# Patient Record
Sex: FEMALE | Race: WHITE | Employment: UNEMPLOYED | RURAL
[De-identification: names, ages, dates, MRNs, and addresses within clinical notes are randomized per-mention and may not be internally consistent; named-entity substitution may affect disease eponyms.]

---

## 2017-03-17 ENCOUNTER — OFFICE VISIT (OUTPATIENT)
Dept: FAMILY MEDICINE CLINIC | Age: 42
End: 2017-03-17
Payer: COMMERCIAL

## 2017-03-17 VITALS
BODY MASS INDEX: 25.78 KG/M2 | HEIGHT: 64 IN | SYSTOLIC BLOOD PRESSURE: 120 MMHG | WEIGHT: 151 LBS | OXYGEN SATURATION: 98 % | DIASTOLIC BLOOD PRESSURE: 88 MMHG | HEART RATE: 91 BPM

## 2017-03-17 DIAGNOSIS — G43.909 MIGRAINE WITHOUT STATUS MIGRAINOSUS, NOT INTRACTABLE, UNSPECIFIED MIGRAINE TYPE: ICD-10-CM

## 2017-03-17 DIAGNOSIS — G47.00 INSOMNIA, UNSPECIFIED TYPE: ICD-10-CM

## 2017-03-17 DIAGNOSIS — F32.A DEPRESSION, UNSPECIFIED DEPRESSION TYPE: ICD-10-CM

## 2017-03-17 DIAGNOSIS — M79.7 FIBROMYALGIA: Primary | ICD-10-CM

## 2017-03-17 DIAGNOSIS — E55.9 VITAMIN D DEFICIENCY: ICD-10-CM

## 2017-03-17 PROCEDURE — 99214 OFFICE O/P EST MOD 30 MIN: CPT | Performed by: NURSE PRACTITIONER

## 2017-03-17 RX ORDER — ZOLPIDEM TARTRATE 12.5 MG/1
12.5 TABLET, FILM COATED, EXTENDED RELEASE ORAL NIGHTLY PRN
Qty: 30 TABLET | Refills: 2 | Status: SHIPPED | OUTPATIENT
Start: 2017-03-17 | End: 2017-04-16

## 2017-03-17 RX ORDER — SERTRALINE HYDROCHLORIDE 100 MG/1
100 TABLET, FILM COATED ORAL DAILY
Qty: 90 TABLET | Refills: 1 | Status: SHIPPED | OUTPATIENT
Start: 2017-03-17 | End: 2017-07-07

## 2017-03-17 RX ORDER — TRAMADOL HYDROCHLORIDE 50 MG/1
TABLET ORAL
Qty: 120 TABLET | Refills: 2 | Status: SHIPPED | OUTPATIENT
Start: 2017-03-17 | End: 2017-07-07 | Stop reason: SDUPTHER

## 2017-03-17 RX ORDER — ERGOCALCIFEROL 1.25 MG/1
CAPSULE ORAL
Qty: 12 CAPSULE | Refills: 2 | Status: SHIPPED | OUTPATIENT
Start: 2017-03-17 | End: 2017-07-06 | Stop reason: SDUPTHER

## 2017-03-17 RX ORDER — TOPIRAMATE 100 MG/1
100 TABLET, FILM COATED ORAL 2 TIMES DAILY
Qty: 180 TABLET | Refills: 1 | Status: SHIPPED | OUTPATIENT
Start: 2017-03-17 | End: 2017-07-07 | Stop reason: SDUPTHER

## 2017-03-17 RX ORDER — CYCLOBENZAPRINE HCL 10 MG
TABLET ORAL
Qty: 90 TABLET | Refills: 2 | Status: SHIPPED | OUTPATIENT
Start: 2017-03-17 | End: 2020-10-26 | Stop reason: SDUPTHER

## 2017-03-17 RX ORDER — GABAPENTIN 400 MG/1
400 CAPSULE ORAL 3 TIMES DAILY
Qty: 90 CAPSULE | Refills: 2 | Status: SHIPPED | OUTPATIENT
Start: 2017-03-17 | End: 2017-07-07 | Stop reason: SDUPTHER

## 2017-03-17 ASSESSMENT — ENCOUNTER SYMPTOMS
COUGH: 0
SORE THROAT: 0
EYES NEGATIVE: 1
DIARRHEA: 0
ABDOMINAL PAIN: 0
NAUSEA: 0
SHORTNESS OF BREATH: 0
WHEEZING: 0
VOMITING: 0
ORTHOPNEA: 0

## 2017-07-06 DIAGNOSIS — E55.9 VITAMIN D DEFICIENCY: ICD-10-CM

## 2017-07-07 ENCOUNTER — OFFICE VISIT (OUTPATIENT)
Dept: FAMILY MEDICINE CLINIC | Age: 42
End: 2017-07-07
Payer: COMMERCIAL

## 2017-07-07 VITALS
SYSTOLIC BLOOD PRESSURE: 120 MMHG | WEIGHT: 153 LBS | DIASTOLIC BLOOD PRESSURE: 66 MMHG | BODY MASS INDEX: 26.12 KG/M2 | HEIGHT: 64 IN

## 2017-07-07 DIAGNOSIS — M79.7 FIBROMYALGIA: Primary | ICD-10-CM

## 2017-07-07 DIAGNOSIS — G43.909 MIGRAINE WITHOUT STATUS MIGRAINOSUS, NOT INTRACTABLE, UNSPECIFIED MIGRAINE TYPE: ICD-10-CM

## 2017-07-07 DIAGNOSIS — R53.82 CHRONIC FATIGUE: ICD-10-CM

## 2017-07-07 LAB
AMPHETAMINE SCREEN, URINE: NORMAL
BARBITURATE SCREEN, URINE: NORMAL
BENZODIAZEPINE SCREEN, URINE: NORMAL
COCAINE METABOLITE SCREEN URINE: NORMAL
MDMA URINE: NORMAL
METHADONE SCREEN, URINE: NORMAL
METHAMPHETAMINE, URINE: NORMAL
OPIATE SCREEN URINE: NORMAL
OXYCODONE SCREEN URINE: NORMAL
PHENCYCLIDINE SCREEN URINE: NORMAL
PROPOXYPHENE SCREEN, URINE: NORMAL
THC: NORMAL
TRICYCLIC ANTIDEPRESSANTS, UR: NORMAL

## 2017-07-07 PROCEDURE — 99214 OFFICE O/P EST MOD 30 MIN: CPT | Performed by: NURSE PRACTITIONER

## 2017-07-07 PROCEDURE — 80305 DRUG TEST PRSMV DIR OPT OBS: CPT | Performed by: NURSE PRACTITIONER

## 2017-07-07 RX ORDER — ERGOCALCIFEROL 1.25 MG/1
CAPSULE ORAL
Qty: 6 CAPSULE | Refills: 2 | Status: SHIPPED | OUTPATIENT
Start: 2017-07-07 | End: 2017-10-04 | Stop reason: SDUPTHER

## 2017-07-07 RX ORDER — GABAPENTIN 400 MG/1
400 CAPSULE ORAL 3 TIMES DAILY
Qty: 90 CAPSULE | Refills: 2 | Status: SHIPPED | OUTPATIENT
Start: 2017-07-07 | End: 2017-10-04 | Stop reason: SDUPTHER

## 2017-07-07 RX ORDER — TRAMADOL HYDROCHLORIDE 50 MG/1
TABLET ORAL
Qty: 120 TABLET | Refills: 2 | Status: SHIPPED | OUTPATIENT
Start: 2017-07-07 | End: 2017-10-04 | Stop reason: SDUPTHER

## 2017-07-07 RX ORDER — TOPIRAMATE 100 MG/1
100 TABLET, FILM COATED ORAL 2 TIMES DAILY
Qty: 180 TABLET | Refills: 1 | Status: SHIPPED | OUTPATIENT
Start: 2017-07-07 | End: 2017-10-04 | Stop reason: SDUPTHER

## 2017-07-07 ASSESSMENT — PATIENT HEALTH QUESTIONNAIRE - PHQ9
SUM OF ALL RESPONSES TO PHQ QUESTIONS 1-9: 0
2. FEELING DOWN, DEPRESSED OR HOPELESS: 0
1. LITTLE INTEREST OR PLEASURE IN DOING THINGS: 0
SUM OF ALL RESPONSES TO PHQ9 QUESTIONS 1 & 2: 0

## 2017-07-14 ASSESSMENT — ENCOUNTER SYMPTOMS
WHEEZING: 0
NAUSEA: 0
ABDOMINAL PAIN: 0
ORTHOPNEA: 0
SHORTNESS OF BREATH: 0
VOMITING: 0
COUGH: 0
EYES NEGATIVE: 1
DIARRHEA: 0
SORE THROAT: 0

## 2017-10-04 ENCOUNTER — OFFICE VISIT (OUTPATIENT)
Dept: FAMILY MEDICINE CLINIC | Age: 42
End: 2017-10-04
Payer: COMMERCIAL

## 2017-10-04 VITALS
SYSTOLIC BLOOD PRESSURE: 112 MMHG | HEIGHT: 64 IN | WEIGHT: 162.1 LBS | DIASTOLIC BLOOD PRESSURE: 72 MMHG | BODY MASS INDEX: 27.68 KG/M2 | OXYGEN SATURATION: 98 % | HEART RATE: 87 BPM

## 2017-10-04 DIAGNOSIS — Z13.220 SCREENING FOR HYPERLIPIDEMIA: Primary | ICD-10-CM

## 2017-10-04 DIAGNOSIS — M79.7 FIBROMYALGIA: ICD-10-CM

## 2017-10-04 DIAGNOSIS — R53.82 CHRONIC FATIGUE: ICD-10-CM

## 2017-10-04 DIAGNOSIS — R30.0 DYSURIA: ICD-10-CM

## 2017-10-04 DIAGNOSIS — E55.9 VITAMIN D DEFICIENCY: ICD-10-CM

## 2017-10-04 DIAGNOSIS — G43.909 MIGRAINE WITHOUT STATUS MIGRAINOSUS, NOT INTRACTABLE, UNSPECIFIED MIGRAINE TYPE: ICD-10-CM

## 2017-10-04 LAB
ALBUMIN SERPL-MCNC: 4.1 G/DL (ref 3.5–5.2)
ALP BLD-CCNC: 59 U/L (ref 35–104)
ALT SERPL-CCNC: 8 U/L (ref 5–33)
ANION GAP SERPL CALCULATED.3IONS-SCNC: 14 MMOL/L (ref 7–19)
AST SERPL-CCNC: 13 U/L (ref 5–32)
BILIRUB SERPL-MCNC: 0.3 MG/DL (ref 0.2–1.2)
BILIRUBIN URINE: NEGATIVE
BLOOD, URINE: NEGATIVE
BUN BLDV-MCNC: 11 MG/DL (ref 6–20)
CALCIUM SERPL-MCNC: 9.3 MG/DL (ref 8.6–10)
CHLORIDE BLD-SCNC: 103 MMOL/L (ref 98–111)
CHOLESTEROL, TOTAL: 181 MG/DL (ref 160–199)
CLARITY: CLEAR
CO2: 24 MMOL/L (ref 22–29)
COLOR: YELLOW
CREAT SERPL-MCNC: 0.8 MG/DL (ref 0.5–0.9)
GFR NON-AFRICAN AMERICAN: >60
GLUCOSE BLD-MCNC: 69 MG/DL (ref 74–109)
GLUCOSE URINE: NEGATIVE MG/DL
HCT VFR BLD CALC: 41.7 % (ref 37–47)
HDLC SERPL-MCNC: 58 MG/DL (ref 65–121)
HEMOGLOBIN: 13.6 G/DL (ref 12–16)
KETONES, URINE: NEGATIVE MG/DL
LDL CHOLESTEROL CALCULATED: 109 MG/DL
LEUKOCYTE ESTERASE, URINE: NEGATIVE
MCH RBC QN AUTO: 31.3 PG (ref 27–31)
MCHC RBC AUTO-ENTMCNC: 32.6 G/DL (ref 33–37)
MCV RBC AUTO: 95.9 FL (ref 81–99)
NITRITE, URINE: NEGATIVE
PDW BLD-RTO: 13 % (ref 11.5–14.5)
PH UA: 7
PLATELET # BLD: 257 K/UL (ref 130–400)
PMV BLD AUTO: 11.2 FL (ref 9.4–12.3)
POTASSIUM SERPL-SCNC: 4 MMOL/L (ref 3.5–5)
PROTEIN UA: NEGATIVE MG/DL
RBC # BLD: 4.35 M/UL (ref 4.2–5.4)
SODIUM BLD-SCNC: 141 MMOL/L (ref 136–145)
SPECIFIC GRAVITY UA: 1.01
TOTAL PROTEIN: 7.3 G/DL (ref 6.6–8.7)
TRIGL SERPL-MCNC: 68 MG/DL (ref 149–199)
URINE TYPE: NORMAL
UROBILINOGEN, URINE: 0.2 E.U./DL
VITAMIN B-12: 397 PG/ML (ref 211–946)
VITAMIN D 25-HYDROXY: 24 NG/ML
WBC # BLD: 8.2 K/UL (ref 4.8–10.8)

## 2017-10-04 PROCEDURE — 99214 OFFICE O/P EST MOD 30 MIN: CPT | Performed by: NURSE PRACTITIONER

## 2017-10-04 RX ORDER — TOPIRAMATE 100 MG/1
100 TABLET, FILM COATED ORAL 2 TIMES DAILY
Qty: 180 TABLET | Refills: 1 | Status: SHIPPED | OUTPATIENT
Start: 2017-10-04 | End: 2019-12-11

## 2017-10-04 RX ORDER — TRAMADOL HYDROCHLORIDE 50 MG/1
TABLET ORAL
Qty: 120 TABLET | Refills: 2 | Status: SHIPPED | OUTPATIENT
Start: 2017-10-04 | End: 2018-03-19 | Stop reason: SDUPTHER

## 2017-10-04 RX ORDER — GABAPENTIN 400 MG/1
400 CAPSULE ORAL 3 TIMES DAILY
Qty: 90 CAPSULE | Refills: 2 | Status: SHIPPED | OUTPATIENT
Start: 2017-10-04 | End: 2018-03-19 | Stop reason: SDUPTHER

## 2017-10-04 RX ORDER — ERGOCALCIFEROL 1.25 MG/1
CAPSULE ORAL
Qty: 8 CAPSULE | Refills: 2 | Status: SHIPPED | OUTPATIENT
Start: 2017-10-04 | End: 2017-10-23 | Stop reason: SDUPTHER

## 2017-10-04 ASSESSMENT — ENCOUNTER SYMPTOMS
SORE THROAT: 0
SHORTNESS OF BREATH: 0
VOMITING: 0
ABDOMINAL PAIN: 0
DIARRHEA: 0
WHEEZING: 0
COUGH: 0
ORTHOPNEA: 0
NAUSEA: 0

## 2017-10-04 NOTE — PROGRESS NOTES
capsule by mouth 3 times daily 90 capsule 2    topiramate (TOPAMAX) 100 MG tablet Take 1 tablet by mouth 2 times daily 180 tablet 1    vitamin D (ERGOCALCIFEROL) 57572 units CAPS capsule TAKE 1 CAPSULE TWICE A WEEK 8 capsule 2    cyclobenzaprine (FLEXERIL) 10 MG tablet TAKE ONE TABLET BY MOUTH 3 TIMES A DAY AS NEEDED FOR MUSCLES SPASMS 90 tablet 2    cyanocobalamin 1000 MCG/ML injection Inject 1 mL into the muscle every 14 days Please dispense 10ml each prescription, either one 10ml vial or 10 1 ml vials. 10 mL 5    SYRINGE-NEEDLE, DISP, 3 ML (BD ECLIPSE SYRINGE) 22G X 1\" 3 ML MISC Use with b 12 every 14 days 25 each 2    albuterol sulfate HFA (PROVENTIL HFA) 108 (90 BASE) MCG/ACT inhaler Inhale 2 puffs into the lungs every 6 hours as needed for Wheezing 1 Inhaler 3     No current facility-administered medications for this visit. Past Medical History:   Diagnosis Date    Carpal tunnel syndrome on right     Depression     Headache     Irritable bowel syndrome        Objective:   Physical Exam   Constitutional: She is oriented to person, place, and time. She appears well-developed and well-nourished. No distress. Neck: Normal range of motion. Neck supple. Cardiovascular: Normal rate, regular rhythm and normal heart sounds. Pulmonary/Chest: Effort normal and breath sounds normal. No respiratory distress. Abdominal: Soft. Bowel sounds are normal. She exhibits no distension. There is no tenderness. Musculoskeletal: Normal range of motion. Lymphadenopathy:     She has no cervical adenopathy. Neurological: She is alert and oriented to person, place, and time. Skin: Skin is warm and dry. Psychiatric: She has a normal mood and affect. Her behavior is normal.   Vitals reviewed. /72  Pulse 87  Ht 5' 4\" (1.626 m)  Wt 162 lb 1.6 oz (73.5 kg)  SpO2 98%  BMI 27.82 kg/m2    Assessment:      1. Screening for hyperlipidemia    2. Fibromyalgia    3.  Migraine without status

## 2017-10-21 DIAGNOSIS — E55.9 VITAMIN D DEFICIENCY: ICD-10-CM

## 2017-10-23 RX ORDER — ERGOCALCIFEROL 1.25 MG/1
CAPSULE ORAL
Qty: 6 CAPSULE | Refills: 2 | Status: SHIPPED | OUTPATIENT
Start: 2017-10-23 | End: 2019-12-11

## 2018-03-19 ENCOUNTER — OFFICE VISIT (OUTPATIENT)
Dept: PRIMARY CARE CLINIC | Age: 43
End: 2018-03-19
Payer: COMMERCIAL

## 2018-03-19 VITALS
HEART RATE: 96 BPM | DIASTOLIC BLOOD PRESSURE: 82 MMHG | BODY MASS INDEX: 27.66 KG/M2 | SYSTOLIC BLOOD PRESSURE: 114 MMHG | OXYGEN SATURATION: 98 % | RESPIRATION RATE: 20 BRPM | HEIGHT: 64 IN | TEMPERATURE: 98.5 F | WEIGHT: 162 LBS

## 2018-03-19 DIAGNOSIS — R79.89 ABNORMAL THYROID BLOOD TEST: ICD-10-CM

## 2018-03-19 DIAGNOSIS — Z12.31 SCREENING MAMMOGRAM, ENCOUNTER FOR: ICD-10-CM

## 2018-03-19 DIAGNOSIS — M79.7 FIBROMYALGIA: Primary | ICD-10-CM

## 2018-03-19 DIAGNOSIS — R53.83 FATIGUE, UNSPECIFIED TYPE: ICD-10-CM

## 2018-03-19 LAB
T4 FREE: 1.1 NG/DL (ref 0.9–1.7)
TSH SERPL DL<=0.05 MIU/L-ACNC: 2.29 UIU/ML (ref 0.27–4.2)

## 2018-03-19 PROCEDURE — 36415 COLL VENOUS BLD VENIPUNCTURE: CPT | Performed by: FAMILY MEDICINE

## 2018-03-19 PROCEDURE — 99214 OFFICE O/P EST MOD 30 MIN: CPT | Performed by: FAMILY MEDICINE

## 2018-03-19 RX ORDER — ALBUTEROL SULFATE 90 UG/1
2 AEROSOL, METERED RESPIRATORY (INHALATION) EVERY 6 HOURS PRN
Qty: 1 INHALER | Refills: 3 | Status: SHIPPED | OUTPATIENT
Start: 2018-03-19 | End: 2020-10-26 | Stop reason: SDUPTHER

## 2018-03-19 RX ORDER — TRAMADOL HYDROCHLORIDE 50 MG/1
TABLET ORAL
Qty: 120 TABLET | Refills: 2 | Status: SHIPPED | OUTPATIENT
Start: 2018-03-19 | End: 2018-04-19

## 2018-03-19 RX ORDER — GABAPENTIN 400 MG/1
400 CAPSULE ORAL 3 TIMES DAILY
Qty: 90 CAPSULE | Refills: 2 | Status: SHIPPED | OUTPATIENT
Start: 2018-03-19 | End: 2018-07-15 | Stop reason: SDUPTHER

## 2018-03-19 ASSESSMENT — ENCOUNTER SYMPTOMS
ABDOMINAL PAIN: 0
DIARRHEA: 0
COLOR CHANGE: 0
EYE DISCHARGE: 0
BACK PAIN: 0
VOMITING: 0
WHEEZING: 0
COUGH: 0
NAUSEA: 0

## 2018-09-12 DIAGNOSIS — M79.7 FIBROMYALGIA: ICD-10-CM

## 2018-09-12 RX ORDER — TRAMADOL HYDROCHLORIDE 50 MG/1
TABLET ORAL
Qty: 120 TABLET | Refills: 0 | Status: SHIPPED | OUTPATIENT
Start: 2018-09-12 | End: 2018-10-04 | Stop reason: SDUPTHER

## 2018-10-04 ENCOUNTER — OFFICE VISIT (OUTPATIENT)
Dept: PRIMARY CARE CLINIC | Age: 43
End: 2018-10-04
Payer: COMMERCIAL

## 2018-10-04 VITALS
DIASTOLIC BLOOD PRESSURE: 78 MMHG | RESPIRATION RATE: 20 BRPM | SYSTOLIC BLOOD PRESSURE: 112 MMHG | HEART RATE: 95 BPM | BODY MASS INDEX: 27.49 KG/M2 | WEIGHT: 161 LBS | OXYGEN SATURATION: 98 % | TEMPERATURE: 97.7 F | HEIGHT: 64 IN

## 2018-10-04 DIAGNOSIS — G43.909 MIGRAINE WITHOUT STATUS MIGRAINOSUS, NOT INTRACTABLE, UNSPECIFIED MIGRAINE TYPE: Primary | ICD-10-CM

## 2018-10-04 DIAGNOSIS — Z79.899 MEDICATION MANAGEMENT: ICD-10-CM

## 2018-10-04 DIAGNOSIS — M79.7 FIBROMYALGIA: ICD-10-CM

## 2018-10-04 LAB

## 2018-10-04 PROCEDURE — 80305 DRUG TEST PRSMV DIR OPT OBS: CPT | Performed by: FAMILY MEDICINE

## 2018-10-04 PROCEDURE — 99213 OFFICE O/P EST LOW 20 MIN: CPT | Performed by: FAMILY MEDICINE

## 2018-10-04 RX ORDER — TRAMADOL HYDROCHLORIDE 50 MG/1
50 TABLET ORAL EVERY 8 HOURS PRN
Qty: 120 TABLET | Refills: 0 | Status: SHIPPED | OUTPATIENT
Start: 2018-10-04 | End: 2018-11-04

## 2018-10-04 RX ORDER — GABAPENTIN 400 MG/1
400 CAPSULE ORAL 3 TIMES DAILY
Qty: 90 CAPSULE | Refills: 1 | Status: SHIPPED | OUTPATIENT
Start: 2018-10-04 | End: 2019-12-11

## 2018-10-04 ASSESSMENT — ENCOUNTER SYMPTOMS
NAUSEA: 0
BACK PAIN: 0
WHEEZING: 0
COUGH: 0
DIARRHEA: 0
COLOR CHANGE: 0
ABDOMINAL PAIN: 0
VOMITING: 0
EYE DISCHARGE: 0

## 2018-10-04 NOTE — PROGRESS NOTES
type    Fibromyalgia  -     traMADol (ULTRAM) 50 MG tablet; Take 1 tablet by mouth every 8 hours as needed for Pain for up to 31 days. .  -     gabapentin (NEURONTIN) 400 MG capsule; Take 1 capsule by mouth 3 times daily for 30 days. .    Medication management  -     POCT Rapid Drug Screen        PLAN:    Continue current medications. Med agreement in the chart. Urine drug screen obtained today. Follow-up in 6 months for checkup unless needed sooner. EMR Dragon/transcription disclaimer:  Much of this encounter note is electronic transcription/translation of spoken language to printed texts. The electronic translation of spoken language may be erroneous, or at times, nonsensical words or phrases may be inadvertently transcribed.   Although I have reviewed the note for such errors, some may still exist.

## 2019-12-11 ENCOUNTER — OFFICE VISIT (OUTPATIENT)
Dept: PRIMARY CARE CLINIC | Age: 44
End: 2019-12-11
Payer: COMMERCIAL

## 2019-12-11 VITALS
HEIGHT: 64 IN | SYSTOLIC BLOOD PRESSURE: 134 MMHG | RESPIRATION RATE: 18 BRPM | DIASTOLIC BLOOD PRESSURE: 86 MMHG | BODY MASS INDEX: 27.66 KG/M2 | WEIGHT: 162 LBS | HEART RATE: 81 BPM | OXYGEN SATURATION: 99 % | TEMPERATURE: 98.3 F

## 2019-12-11 DIAGNOSIS — F33.1 MODERATE EPISODE OF RECURRENT MAJOR DEPRESSIVE DISORDER (HCC): ICD-10-CM

## 2019-12-11 DIAGNOSIS — Z00.00 WELLNESS EXAMINATION: Primary | ICD-10-CM

## 2019-12-11 DIAGNOSIS — M79.7 FIBROMYALGIA: ICD-10-CM

## 2019-12-11 LAB
ALBUMIN SERPL-MCNC: 4.9 G/DL (ref 3.5–5.2)
ALP BLD-CCNC: 73 U/L (ref 35–104)
ALT SERPL-CCNC: 6 U/L (ref 5–33)
ANION GAP SERPL CALCULATED.3IONS-SCNC: 21 MMOL/L (ref 7–19)
AST SERPL-CCNC: 12 U/L (ref 5–32)
BASOPHILS ABSOLUTE: 0.1 K/UL (ref 0–0.2)
BASOPHILS RELATIVE PERCENT: 0.7 % (ref 0–1)
BILIRUB SERPL-MCNC: <0.2 MG/DL (ref 0.2–1.2)
BUN BLDV-MCNC: 11 MG/DL (ref 6–20)
CALCIUM SERPL-MCNC: 10.3 MG/DL (ref 8.6–10)
CHLORIDE BLD-SCNC: 103 MMOL/L (ref 98–111)
CHOLESTEROL, TOTAL: 177 MG/DL (ref 160–199)
CO2: 21 MMOL/L (ref 22–29)
CREAT SERPL-MCNC: 0.8 MG/DL (ref 0.5–0.9)
EOSINOPHILS ABSOLUTE: 0.1 K/UL (ref 0–0.6)
EOSINOPHILS RELATIVE PERCENT: 1.3 % (ref 0–5)
GFR NON-AFRICAN AMERICAN: >60
GLUCOSE BLD-MCNC: 85 MG/DL (ref 74–109)
HCT VFR BLD CALC: 44.9 % (ref 37–47)
HDLC SERPL-MCNC: 63 MG/DL (ref 65–121)
HEMOGLOBIN: 14.2 G/DL (ref 12–16)
IMMATURE GRANULOCYTES #: 0 K/UL
LDL CHOLESTEROL CALCULATED: 98 MG/DL
LYMPHOCYTES ABSOLUTE: 1.9 K/UL (ref 1.1–4.5)
LYMPHOCYTES RELATIVE PERCENT: 27 % (ref 20–40)
MCH RBC QN AUTO: 30 PG (ref 27–31)
MCHC RBC AUTO-ENTMCNC: 31.6 G/DL (ref 33–37)
MCV RBC AUTO: 94.7 FL (ref 81–99)
MONOCYTES ABSOLUTE: 0.5 K/UL (ref 0–0.9)
MONOCYTES RELATIVE PERCENT: 7.1 % (ref 0–10)
NEUTROPHILS ABSOLUTE: 4.4 K/UL (ref 1.5–7.5)
NEUTROPHILS RELATIVE PERCENT: 63.6 % (ref 50–65)
PDW BLD-RTO: 12.6 % (ref 11.5–14.5)
PLATELET # BLD: 280 K/UL (ref 130–400)
PMV BLD AUTO: 11.2 FL (ref 9.4–12.3)
POTASSIUM SERPL-SCNC: 4.6 MMOL/L (ref 3.5–5)
RBC # BLD: 4.74 M/UL (ref 4.2–5.4)
SODIUM BLD-SCNC: 145 MMOL/L (ref 136–145)
TOTAL PROTEIN: 8.4 G/DL (ref 6.6–8.7)
TRIGL SERPL-MCNC: 82 MG/DL (ref 0–149)
TSH SERPL DL<=0.05 MIU/L-ACNC: 1.1 UIU/ML (ref 0.27–4.2)
WBC # BLD: 6.9 K/UL (ref 4.8–10.8)

## 2019-12-11 PROCEDURE — 36415 COLL VENOUS BLD VENIPUNCTURE: CPT | Performed by: FAMILY MEDICINE

## 2019-12-11 PROCEDURE — 99214 OFFICE O/P EST MOD 30 MIN: CPT | Performed by: FAMILY MEDICINE

## 2019-12-11 RX ORDER — TRAMADOL HYDROCHLORIDE 50 MG/1
50 TABLET ORAL 2 TIMES DAILY
COMMUNITY
End: 2019-12-11 | Stop reason: ALTCHOICE

## 2019-12-11 RX ORDER — SERTRALINE HYDROCHLORIDE 25 MG/1
25 TABLET, FILM COATED ORAL DAILY
COMMUNITY
End: 2019-12-11 | Stop reason: SDUPTHER

## 2019-12-11 RX ORDER — GABAPENTIN 400 MG/1
400 CAPSULE ORAL 3 TIMES DAILY
Qty: 90 CAPSULE | Refills: 0 | Status: SHIPPED | OUTPATIENT
Start: 2019-12-11 | End: 2020-10-26

## 2019-12-11 ASSESSMENT — ENCOUNTER SYMPTOMS
ABDOMINAL PAIN: 0
DIARRHEA: 0
COUGH: 0
VOMITING: 0
EYE DISCHARGE: 0
WHEEZING: 0
BACK PAIN: 1
NAUSEA: 0
COLOR CHANGE: 0

## 2019-12-23 ENCOUNTER — TELEPHONE (OUTPATIENT)
Dept: PRIMARY CARE CLINIC | Age: 44
End: 2019-12-23

## 2019-12-30 RX ORDER — AMITRIPTYLINE HYDROCHLORIDE 25 MG/1
25 TABLET, FILM COATED ORAL NIGHTLY
Qty: 30 TABLET | Refills: 5 | Status: SHIPPED | OUTPATIENT
Start: 2019-12-30 | End: 2020-04-23 | Stop reason: SDUPTHER

## 2020-01-06 RX ORDER — SERTRALINE HYDROCHLORIDE 25 MG/1
25 TABLET, FILM COATED ORAL DAILY
Qty: 90 TABLET | Refills: 3 | Status: SHIPPED | OUTPATIENT
Start: 2020-01-06 | End: 2020-01-09 | Stop reason: ALTCHOICE

## 2020-01-09 ENCOUNTER — OFFICE VISIT (OUTPATIENT)
Dept: PRIMARY CARE CLINIC | Age: 45
End: 2020-01-09
Payer: COMMERCIAL

## 2020-01-09 VITALS
SYSTOLIC BLOOD PRESSURE: 108 MMHG | HEIGHT: 64 IN | OXYGEN SATURATION: 94 % | DIASTOLIC BLOOD PRESSURE: 62 MMHG | WEIGHT: 163 LBS | TEMPERATURE: 99 F | BODY MASS INDEX: 27.83 KG/M2 | HEART RATE: 84 BPM | RESPIRATION RATE: 22 BRPM

## 2020-01-09 PROCEDURE — 99213 OFFICE O/P EST LOW 20 MIN: CPT | Performed by: FAMILY MEDICINE

## 2020-01-10 ASSESSMENT — ENCOUNTER SYMPTOMS
WHEEZING: 0
NAUSEA: 0
DIARRHEA: 0
COUGH: 0
COLOR CHANGE: 0
EYE DISCHARGE: 0
VOMITING: 0
BACK PAIN: 0
ABDOMINAL PAIN: 0

## 2020-01-10 NOTE — PROGRESS NOTES
vomiting. Genitourinary: Negative for difficulty urinating, frequency and urgency. Musculoskeletal: Positive for arthralgias and myalgias. Negative for back pain and gait problem. Skin: Negative for color change and rash. Neurological: Negative for dizziness and headaches. Hematological: Does not bruise/bleed easily. Psychiatric/Behavioral: Negative for sleep disturbance and suicidal ideas. OBJECTIVE:     Physical Exam  Constitutional:       General: She is not in acute distress. Appearance: She is well-developed. She is not diaphoretic. HENT:      Head: Normocephalic and atraumatic. Neck:      Musculoskeletal: Normal range of motion and neck supple. Cardiovascular:      Rate and Rhythm: Normal rate and regular rhythm. Heart sounds: Normal heart sounds. No murmur. Pulmonary:      Effort: Pulmonary effort is normal. No respiratory distress. Breath sounds: Normal breath sounds. Skin:     General: Skin is warm and dry. Neurological:      Mental Status: She is alert and oriented to person, place, and time. Psychiatric:         Behavior: Behavior normal.         Thought Content: Thought content normal.         Judgment: Judgment normal.        /62 (Site: Left Upper Arm, Position: Sitting, Cuff Size: Medium Adult)   Pulse 84   Temp 99 °F (37.2 °C) (Temporal)   Resp 22   Ht 5' 4\" (1.626 m)   Wt 163 lb (73.9 kg)   SpO2 94%   BMI 27.98 kg/m²      ASSESSMENT:    Christopher Goodrich was seen today for 1 month follow-up, depression and chronic pain. Diagnoses and all orders for this visit:    Fibromyalgia    Fatigue, unspecified type        PLAN:    Discussed that we would continue the amitriptyline for a few more weeks. I would like for her to follow back up with me in about 3 to 4 weeks to see how she is doing. If symptoms or not better at that point discussed that we could either increase the dose or we could change to something different.     EMR Dragon/transcription disclaimer:  Much of this encounter note is electronic transcription/translation of spoken language toprinted texts. The electronic translation of spoken language may be erroneous, or at times, nonsensical words or phrases may be inadvertently transcribed.   Although I have reviewed the note for such errors, some may stillexist.

## 2020-01-20 RX ORDER — SERTRALINE HYDROCHLORIDE 25 MG/1
25 TABLET, FILM COATED ORAL DAILY
Qty: 90 TABLET | Refills: 3 | OUTPATIENT
Start: 2020-01-20

## 2020-01-23 ENCOUNTER — OFFICE VISIT (OUTPATIENT)
Dept: PRIMARY CARE CLINIC | Age: 45
End: 2020-01-23
Payer: COMMERCIAL

## 2020-01-23 VITALS
BODY MASS INDEX: 27.36 KG/M2 | WEIGHT: 159.4 LBS | TEMPERATURE: 99.1 F | RESPIRATION RATE: 16 BRPM | DIASTOLIC BLOOD PRESSURE: 62 MMHG | SYSTOLIC BLOOD PRESSURE: 110 MMHG | HEART RATE: 98 BPM | OXYGEN SATURATION: 97 %

## 2020-01-23 PROCEDURE — 99213 OFFICE O/P EST LOW 20 MIN: CPT | Performed by: FAMILY MEDICINE

## 2020-01-23 ASSESSMENT — ENCOUNTER SYMPTOMS
ABDOMINAL PAIN: 0
NAUSEA: 0
VOMITING: 0
BACK PAIN: 0
EYE DISCHARGE: 0
COUGH: 0
WHEEZING: 0
DIARRHEA: 0
COLOR CHANGE: 0

## 2020-01-23 NOTE — PROGRESS NOTES
SUBJECTIVE:    Patient ID: Vani Acuna is a 38 y.o. female. HPI:   She is here today for 2-week follow-up on medication. She states she feels like the amitriptyline is significantly helping her. She states that she feels like it is helped with the fibromyalgia. She states that she also feels like it has helped with her insomnia. She states that she does feel little foggy in the mornings when she wakes up but otherwise she is tolerating it well. She states that she also thinks that it is helping with her anxiety and depression. She states that overall she is doing well. Past Medical History:   Diagnosis Date    Carpal tunnel syndrome on right     Depression     Fibromyalgia     Headache(784.0)     Irritable bowel syndrome       Current Outpatient Medications   Medication Sig Dispense Refill    amitriptyline (ELAVIL) 25 MG tablet Take 1 tablet by mouth nightly 30 tablet 5    gabapentin (NEURONTIN) 400 MG capsule Take 1 capsule by mouth 3 times daily for 30 days. 90 capsule 0    albuterol sulfate HFA (VENTOLIN HFA) 108 (90 Base) MCG/ACT inhaler Inhale 2 puffs into the lungs every 6 hours as needed for Wheezing 1 Inhaler 3    cyclobenzaprine (FLEXERIL) 10 MG tablet TAKE ONE TABLET BY MOUTH 3 TIMES A DAY AS NEEDED FOR MUSCLES SPASMS 90 tablet 2     No current facility-administered medications for this visit. Allergies   Allergen Reactions    Codeine      Or synth.  Ms Contin Maza Dyess Sulfate]        Review of Systems   Constitutional: Negative for activity change, appetite change and fever. HENT: Negative for congestion and nosebleeds. Eyes: Negative for discharge. Respiratory: Negative for cough and wheezing. Cardiovascular: Negative for chest pain and leg swelling. Gastrointestinal: Negative for abdominal pain, diarrhea, nausea and vomiting. Genitourinary: Negative for difficulty urinating, frequency and urgency. Musculoskeletal: Negative for back pain and gait problem. phrases may be inadvertently transcribed.   Although I have reviewed the note for such errors, some may stillexist.

## 2020-03-30 ENCOUNTER — VIRTUAL VISIT (OUTPATIENT)
Dept: PRIMARY CARE CLINIC | Age: 45
End: 2020-03-30
Payer: COMMERCIAL

## 2020-03-30 PROCEDURE — 99213 OFFICE O/P EST LOW 20 MIN: CPT | Performed by: FAMILY MEDICINE

## 2020-03-30 RX ORDER — AMOXICILLIN AND CLAVULANATE POTASSIUM 875; 125 MG/1; MG/1
1 TABLET, FILM COATED ORAL 2 TIMES DAILY
Qty: 14 TABLET | Refills: 0 | Status: SHIPPED | OUTPATIENT
Start: 2020-03-30 | End: 2020-04-06

## 2020-03-30 ASSESSMENT — ENCOUNTER SYMPTOMS
VOMITING: 0
COUGH: 0
EYE DISCHARGE: 0
BACK PAIN: 0
DIARRHEA: 0
COLOR CHANGE: 0
ABDOMINAL PAIN: 0
NAUSEA: 0
WHEEZING: 0

## 2020-03-30 NOTE — PROGRESS NOTES
Evelyn [facility], [facility], [place]  Phone:  [phone]      3/30/2020     TELEHEALTH EVALUATION -- Audio/Visual (During TFVHI-57 public health emergency)    HPI:    Nila Fleischer (:  1975) has requested an audio/video evaluation for the following concern(s):    Gum swelling for 2 days on upper right gum. She has a partial that she wears in that area. She states that it has been tender and is also tender to chew. She has had a history of gum abscesses in the past.  She is not running fever and is not had chills. She states that she has not had any bad taste in her mouth. She has not been on any antibiotics recently. Review of Systems   Constitutional: Negative for activity change, appetite change and fever. HENT: Positive for mouth sores (gum swelling and pain). Negative for congestion and nosebleeds. Eyes: Negative for discharge. Respiratory: Negative for cough and wheezing. Cardiovascular: Negative for chest pain and leg swelling. Gastrointestinal: Negative for abdominal pain, diarrhea, nausea and vomiting. Genitourinary: Negative for difficulty urinating, frequency and urgency. Musculoskeletal: Negative for back pain and gait problem. Skin: Negative for color change and rash. Neurological: Negative for dizziness and headaches. Hematological: Does not bruise/bleed easily. Psychiatric/Behavioral: Negative for sleep disturbance and suicidal ideas. Prior to Visit Medications    Medication Sig Taking? Authorizing Provider   amoxicillin-clavulanate (AUGMENTIN) 875-125 MG per tablet Take 1 tablet by mouth 2 times daily for 7 days Yes Shama Burton MD   amitriptyline (ELAVIL) 25 MG tablet Take 1 tablet by mouth nightly  Jered Kenney MD   gabapentin (NEURONTIN) 400 MG capsule Take 1 capsule by mouth 3 times daily for 30 days.   Shama Burton MD   albuterol sulfate HFA (VENTOLIN HFA) 108 (90 Base) MCG/ACT inhaler Inhale 2 puffs into the lungs every 6 hours as needed for Wheezing  Shama Burton MD   cyclobenzaprine (FLEXERIL) 10 MG tablet TAKE ONE TABLET BY MOUTH 3 TIMES A DAY AS NEEDED FOR MUSCLES SPASMS  Nuris Hutson, APRN - CNP       Social History     Tobacco Use    Smoking status: Former Smoker     Packs/day: 0.50     Years: 30.00     Pack years: 15.00     Types: Cigarettes     Last attempt to quit: 2018     Years since quittin.1    Smokeless tobacco: Never Used   Substance Use Topics    Alcohol use: No    Drug use: Not on file            PHYSICAL EXAMINATION:  Physical Exam  Constitutional:       General: She is not in acute distress. Appearance: Normal appearance. She is not ill-appearing. HENT:      Head: Normocephalic and atraumatic. Nose: Nose normal.      Mouth/Throat:      Mouth: Mucous membranes are moist.   Eyes:      Extraocular Movements: Extraocular movements intact. Conjunctiva/sclera: Conjunctivae normal.   Neck:      Musculoskeletal: Normal range of motion. Skin:     Findings: No rash. Neurological:      General: No focal deficit present. Mental Status: She is alert and oriented to person, place, and time. Mental status is at baseline. Cranial Nerves: No cranial nerve deficit. Psychiatric:         Mood and Affect: Mood normal.         Behavior: Behavior normal.         Thought Content: Thought content normal.         Judgment: Judgment normal.         Due to this being a TeleHealth encounter, evaluation of the following organ systems is limited: Vitals/Constitutional/EENT/Resp/CV/GI//MS/Neuro/Skin/Heme-Lymph-Imm. ASSESSMENT/PLAN:  1. Abscess of upper gum  I have sent Augmentin to the pharmacy for abscess. I encouraged her to do peroxide swishes. She is to let us know if her symptoms or not getting better and we can refer her to oral surgery if needed. Return if symptoms worsen or fail to improve.       An  electronic signature was used to authenticate this

## 2020-04-23 ENCOUNTER — VIRTUAL VISIT (OUTPATIENT)
Dept: PRIMARY CARE CLINIC | Age: 45
End: 2020-04-23
Payer: COMMERCIAL

## 2020-04-23 VITALS — HEIGHT: 64 IN | BODY MASS INDEX: 24.28 KG/M2 | WEIGHT: 142.2 LBS

## 2020-04-23 PROCEDURE — 99213 OFFICE O/P EST LOW 20 MIN: CPT | Performed by: FAMILY MEDICINE

## 2020-04-23 RX ORDER — AMITRIPTYLINE HYDROCHLORIDE 50 MG/1
50 TABLET, FILM COATED ORAL NIGHTLY
Qty: 30 TABLET | Refills: 5 | Status: SHIPPED | OUTPATIENT
Start: 2020-04-23 | End: 2020-05-22 | Stop reason: SDUPTHER

## 2020-04-23 ASSESSMENT — ENCOUNTER SYMPTOMS
COUGH: 0
DIARRHEA: 0
COLOR CHANGE: 0
VOMITING: 0
ABDOMINAL PAIN: 0
WHEEZING: 0
EYE DISCHARGE: 0
NAUSEA: 0
BACK PAIN: 0

## 2020-04-23 NOTE — PROGRESS NOTES
[address], [place]  Phone:  [phone]      2020     TELEHEALTH EVALUATION -- Audio/Visual (During CARCJ-13 public health emergency)    HPI:    Billie Almeida (:  1975) has requested an audio/video evaluation for the following concern(s):    Patient was seen today via video visit for follow-up on her fibromyalgia. She states the amitriptyline does seem to be helping but she feels like the dose needs to be increased some. She states that she is better but feels like that she is still not where she wants to be. She states that she is able to get out and walk now and feels like she has more energy. She states that she is still not sleeping well. She states that she has not really had any side effects to the amitriptyline and seems to be tolerating it well. Review of Systems   Constitutional: Negative for activity change, appetite change and fever. HENT: Negative for congestion and nosebleeds. Eyes: Negative for discharge. Respiratory: Negative for cough and wheezing. Cardiovascular: Negative for chest pain and leg swelling. Gastrointestinal: Negative for abdominal pain, diarrhea, nausea and vomiting. Genitourinary: Negative for difficulty urinating, frequency and urgency. Musculoskeletal: Positive for arthralgias and myalgias. Negative for back pain and gait problem. Skin: Negative for color change and rash. Neurological: Negative for dizziness and headaches. Hematological: Does not bruise/bleed easily. Psychiatric/Behavioral: Positive for sleep disturbance. Negative for suicidal ideas. Prior to Visit Medications    Medication Sig Taking? Authorizing Provider   amitriptyline (ELAVIL) 50 MG tablet Take 1 tablet by mouth nightly Yes Sana Carrera MD   gabapentin (NEURONTIN) 400 MG capsule Take 1 capsule by mouth 3 times daily for 30 days.   Sana Carrera MD   albuterol sulfate HFA (VENTOLIN HFA) 108 (90 Base) MCG/ACT inhaler Inhale 2 puffs into the lungs every 6 hours as needed for Wheezing  Shama Burton MD   cyclobenzaprine (FLEXERIL) 10 MG tablet TAKE ONE TABLET BY MOUTH 3 TIMES A DAY AS NEEDED FOR MUSCLES SPASMS  Nuris Hutson, APRN - CNP       Social History     Tobacco Use    Smoking status: Former Smoker     Packs/day: 0.50     Years: 30.00     Pack years: 15.00     Types: Cigarettes     Last attempt to quit: 2018     Years since quittin.2    Smokeless tobacco: Never Used   Substance Use Topics    Alcohol use: No    Drug use: Not on file        Allergies   Allergen Reactions    Codeine      Or synth.  Ms Contin [Morphine Sulfate]    ,   Past Medical History:   Diagnosis Date    Carpal tunnel syndrome on right     Depression     Fibromyalgia     Headache(784.0)     Irritable bowel syndrome    ,   Past Surgical History:   Procedure Laterality Date     SECTION      MOUTH SURGERY     ,   Social History     Tobacco Use    Smoking status: Former Smoker     Packs/day: 0.50     Years: 30.00     Pack years: 15.00     Types: Cigarettes     Last attempt to quit: 2018     Years since quittin.2    Smokeless tobacco: Never Used   Substance Use Topics    Alcohol use: No    Drug use: Not on file   ,   Family History   Problem Relation Age of Onset    Lung Cancer Mother     Heart Disease Father     Hypertension Brother        PHYSICAL EXAMINATION:  Physical Exam  Constitutional:       General: She is not in acute distress. Appearance: Normal appearance. She is not ill-appearing. HENT:      Head: Normocephalic and atraumatic. Nose: Nose normal.   Eyes:      Extraocular Movements: Extraocular movements intact. Conjunctiva/sclera: Conjunctivae normal.   Neck:      Musculoskeletal: Normal range of motion. Skin:     Findings: No rash. Neurological:      General: No focal deficit present. Mental Status: She is alert and oriented to person, place, and time. Mental status is at baseline. Cranial Nerves: No cranial nerve deficit. Psychiatric:         Attention and Perception: Attention normal.         Mood and Affect: Mood and affect normal.         Speech: Speech normal.         Behavior: Behavior normal. Behavior is cooperative. Thought Content: Thought content normal.         Cognition and Memory: Cognition and memory normal.         Judgment: Judgment normal.         Due to this being a TeleHealth encounter, evaluation of the following organ systems is limited: Vitals/Constitutional/EENT/Resp/CV/GI//MS/Neuro/Skin/Heme-Lymph-Imm. ASSESSMENT/PLAN:  1. Fibromyalgia  I am going to increase her amitriptyline to 50 mg nightly. She is to follow-up with us if symptoms or not improving otherwise we will see her back in 6 months for a physical and checkup unless needed sooner. 2. Insomnia, unspecified type  Increase amitriptyline to 50 mg nightly. Return in about 6 months (around 10/23/2020) for 6 month follow up. An  electronic signature was used to authenticate this note. --Henry Valdez MD on 4/23/2020 at 12:22 PM      Pursuant to the emergency declaration under the [facility], [facility]5 waiver authority and the [employer] Act, this Virtual  Visit was conducted, with patient's consent, to reduce the patient's risk of exposure to COVID-19 and provide continuity of care for an established patient. Services were provided through a video synchronous discussion virtually to substitute for in-person clinic visit.

## 2020-05-26 RX ORDER — AMITRIPTYLINE HYDROCHLORIDE 50 MG/1
50 TABLET, FILM COATED ORAL NIGHTLY
Qty: 90 TABLET | Refills: 3 | Status: SHIPPED | OUTPATIENT
Start: 2020-05-26 | End: 2020-10-26 | Stop reason: SDUPTHER

## 2020-06-25 ENCOUNTER — NURSE ONLY (OUTPATIENT)
Dept: PRIMARY CARE CLINIC | Age: 45
End: 2020-06-25
Payer: COMMERCIAL

## 2020-06-25 LAB
APPEARANCE FLUID: CLEAR
BILIRUBIN, POC: NORMAL
BLOOD URINE, POC: NORMAL
CLARITY, POC: CLEAR
COLOR, POC: YELLOW
GLUCOSE URINE, POC: NORMAL
KETONES, POC: NORMAL
LEUKOCYTE EST, POC: NORMAL
NITRITE, POC: NORMAL
PH, POC: NORMAL
PROTEIN, POC: NORMAL
SPECIFIC GRAVITY, POC: NORMAL
UROBILINOGEN, POC: NORMAL

## 2020-06-25 PROCEDURE — 81002 URINALYSIS NONAUTO W/O SCOPE: CPT | Performed by: FAMILY MEDICINE

## 2020-06-30 ENCOUNTER — OFFICE VISIT (OUTPATIENT)
Dept: PRIMARY CARE CLINIC | Age: 45
End: 2020-06-30
Payer: COMMERCIAL

## 2020-06-30 VITALS
BODY MASS INDEX: 23.93 KG/M2 | SYSTOLIC BLOOD PRESSURE: 104 MMHG | TEMPERATURE: 98.5 F | DIASTOLIC BLOOD PRESSURE: 88 MMHG | HEIGHT: 64 IN | RESPIRATION RATE: 16 BRPM | HEART RATE: 80 BPM | WEIGHT: 140.2 LBS

## 2020-06-30 LAB
BILIRUBIN, POC: ABNORMAL
BLOOD URINE, POC: ABNORMAL
CLARITY, POC: CLEAR
COLOR, POC: YELLOW
GLUCOSE URINE, POC: ABNORMAL
KETONES, POC: ABNORMAL
LEUKOCYTE EST, POC: ABNORMAL
NITRITE, POC: ABNORMAL
PH, POC: 6
PROTEIN, POC: ABNORMAL
SPECIFIC GRAVITY, POC: 1005
UROBILINOGEN, POC: 0.2

## 2020-06-30 PROCEDURE — 81002 URINALYSIS NONAUTO W/O SCOPE: CPT | Performed by: FAMILY MEDICINE

## 2020-06-30 PROCEDURE — 99213 OFFICE O/P EST LOW 20 MIN: CPT | Performed by: FAMILY MEDICINE

## 2020-06-30 RX ORDER — CIPROFLOXACIN 250 MG/1
250 TABLET, FILM COATED ORAL 2 TIMES DAILY
Qty: 14 TABLET | Refills: 0 | Status: SHIPPED | OUTPATIENT
Start: 2020-06-30 | End: 2020-07-07

## 2020-06-30 ASSESSMENT — ENCOUNTER SYMPTOMS
NAUSEA: 0
WHEEZING: 0
COLOR CHANGE: 0
ABDOMINAL PAIN: 0
DIARRHEA: 0
EYE DISCHARGE: 0
COUGH: 0
BACK PAIN: 0
VOMITING: 0

## 2020-06-30 NOTE — PROGRESS NOTES
SUBJECTIVE:    Patient ID: Uzma Santos is a 38 y.o. female. HPI:   Urinary Tract Infection: Patient complains of frequency, burning with urination, suprapubic pressure She has had symptoms for a few days. Patient denies back pain and fever. Patient does have a history of UTIs and has seen urology in the past but has not seen them recently. She has been drinking plenty of fluids and has taken Azo to help with her symptoms. Patient does not have a history of pyelonephritis. Past Medical History:   Diagnosis Date    Carpal tunnel syndrome on right     Depression     Fibromyalgia     Headache(784.0)     Irritable bowel syndrome       Current Outpatient Medications   Medication Sig Dispense Refill    Multiple Vitamins-Minerals (ONE DAILY MULTIVITAMIN WOMEN PO) Take by mouth      ciprofloxacin (CIPRO) 250 MG tablet Take 1 tablet by mouth 2 times daily for 7 days 14 tablet 0    amitriptyline (ELAVIL) 50 MG tablet Take 1 tablet by mouth nightly 90 tablet 3    gabapentin (NEURONTIN) 400 MG capsule Take 1 capsule by mouth 3 times daily for 30 days. 90 capsule 0    albuterol sulfate HFA (VENTOLIN HFA) 108 (90 Base) MCG/ACT inhaler Inhale 2 puffs into the lungs every 6 hours as needed for Wheezing 1 Inhaler 3    cyclobenzaprine (FLEXERIL) 10 MG tablet TAKE ONE TABLET BY MOUTH 3 TIMES A DAY AS NEEDED FOR MUSCLES SPASMS 90 tablet 2     No current facility-administered medications for this visit. Allergies   Allergen Reactions    Codeine      Or synth.  Ms Contin Kandee Lunger Sulfate]        Review of Systems   Constitutional: Negative for activity change, appetite change and fever. HENT: Negative for congestion and nosebleeds. Eyes: Negative for discharge. Respiratory: Negative for cough and wheezing. Cardiovascular: Negative for chest pain and leg swelling. Gastrointestinal: Negative for abdominal pain, diarrhea, nausea and vomiting.    Genitourinary: Positive for dysuria, frequency and urgency. Negative for difficulty urinating. Musculoskeletal: Negative for back pain and gait problem. Skin: Negative for color change and rash. Neurological: Negative for dizziness and headaches. Hematological: Does not bruise/bleed easily. Psychiatric/Behavioral: Negative for sleep disturbance and suicidal ideas. OBJECTIVE:     Physical Exam  Vitals signs reviewed. Constitutional:       General: She is not in acute distress. Appearance: Normal appearance. She is well-developed. She is not diaphoretic. HENT:      Head: Normocephalic and atraumatic. Right Ear: External ear normal.      Left Ear: External ear normal.   Neck:      Musculoskeletal: Normal range of motion and neck supple. Cardiovascular:      Rate and Rhythm: Normal rate and regular rhythm. Pulses: Normal pulses. Heart sounds: Normal heart sounds. No murmur. Pulmonary:      Effort: Pulmonary effort is normal. No respiratory distress. Breath sounds: Normal breath sounds. Skin:     General: Skin is warm and dry. Neurological:      General: No focal deficit present. Mental Status: She is alert and oriented to person, place, and time. Mental status is at baseline. Psychiatric:         Mood and Affect: Mood normal.         Behavior: Behavior normal.         Thought Content: Thought content normal.         Judgment: Judgment normal.        /88 (Site: Left Upper Arm, Position: Sitting, Cuff Size: Medium Adult)   Pulse 80   Temp 98.5 °F (36.9 °C) (Temporal)   Resp 16   Ht 5' 4\" (1.626 m)   Wt 140 lb 3.2 oz (63.6 kg)   BMI 24.07 kg/m²      ASSESSMENT:    Bhavani Mendoza was seen today for urinary tract infection.     Diagnoses and all orders for this visit:    Burning with urination  -     POCT urinalysis dipstick  -     Culture, Urine    Painful urination  -     POCT urinalysis dipstick  -     Culture, Urine    Acute cystitis without hematuria    Other orders  -     ciprofloxacin (CIPRO) 250 MG tablet;

## 2020-07-02 LAB
ORGANISM: ABNORMAL
URINE CULTURE, ROUTINE: ABNORMAL
URINE CULTURE, ROUTINE: ABNORMAL

## 2020-08-06 ENCOUNTER — APPOINTMENT (OUTPATIENT)
Dept: CT IMAGING | Facility: HOSPITAL | Age: 45
End: 2020-08-06

## 2020-08-06 ENCOUNTER — HOSPITAL ENCOUNTER (EMERGENCY)
Facility: HOSPITAL | Age: 45
Discharge: HOME | End: 2020-08-06
Admitting: EMERGENCY MEDICINE

## 2020-08-06 VITALS
HEIGHT: 64 IN | BODY MASS INDEX: 23.39 KG/M2 | TEMPERATURE: 98.5 F | HEART RATE: 78 BPM | SYSTOLIC BLOOD PRESSURE: 126 MMHG | OXYGEN SATURATION: 100 % | DIASTOLIC BLOOD PRESSURE: 71 MMHG | WEIGHT: 137 LBS | RESPIRATION RATE: 16 BRPM

## 2020-08-06 DIAGNOSIS — A04.72 C. DIFFICILE COLITIS: Primary | ICD-10-CM

## 2020-08-06 DIAGNOSIS — R93.5 ABNORMAL CT OF THE ABDOMEN: ICD-10-CM

## 2020-08-06 DIAGNOSIS — K57.32 DIVERTICULITIS OF LARGE INTESTINE WITHOUT PERFORATION OR ABSCESS WITHOUT BLEEDING: ICD-10-CM

## 2020-08-06 LAB
ADV 40+41 DNA STL QL NAA+NON-PROBE: NOT DETECTED
ALBUMIN SERPL-MCNC: 4.3 G/DL (ref 3.5–5.2)
ALBUMIN/GLOB SERPL: 1.3 G/DL
ALP SERPL-CCNC: 102 U/L (ref 39–117)
ALT SERPL W P-5'-P-CCNC: 30 U/L (ref 1–33)
ANION GAP SERPL CALCULATED.3IONS-SCNC: 10 MMOL/L (ref 5–15)
AST SERPL-CCNC: 24 U/L (ref 1–32)
ASTRO TYP 1-8 RNA STL QL NAA+NON-PROBE: NOT DETECTED
BASOPHILS # BLD AUTO: 0.03 10*3/MM3 (ref 0–0.2)
BASOPHILS NFR BLD AUTO: 0.3 % (ref 0–1.5)
BILIRUB SERPL-MCNC: 0.4 MG/DL (ref 0–1.2)
BUN SERPL-MCNC: 12 MG/DL (ref 6–20)
BUN/CREAT SERPL: 16.4 (ref 7–25)
C CAYETANENSIS DNA STL QL NAA+NON-PROBE: NOT DETECTED
C DIFF TOX GENS STL QL NAA+PROBE: POSITIVE
CALCIUM SPEC-SCNC: 9.9 MG/DL (ref 8.6–10.5)
CAMPY SP DNA.DIARRHEA STL QL NAA+PROBE: NOT DETECTED
CHLORIDE SERPL-SCNC: 104 MMOL/L (ref 98–107)
CO2 SERPL-SCNC: 27 MMOL/L (ref 22–29)
CREAT SERPL-MCNC: 0.73 MG/DL (ref 0.57–1)
CRYPTOSP STL CULT: NOT DETECTED
DEPRECATED RDW RBC AUTO: 40.6 FL (ref 37–54)
E COLI DNA SPEC QL NAA+PROBE: NOT DETECTED
E HISTOLYT AG STL-ACNC: NOT DETECTED
EAEC PAA PLAS AGGR+AATA ST NAA+NON-PRB: NOT DETECTED
EC STX1 + STX2 GENES STL NAA+PROBE: NOT DETECTED
EOSINOPHIL # BLD AUTO: 0.14 10*3/MM3 (ref 0–0.4)
EOSINOPHIL NFR BLD AUTO: 1.4 % (ref 0.3–6.2)
EPEC EAE GENE STL QL NAA+NON-PROBE: NOT DETECTED
ERYTHROCYTE [DISTWIDTH] IN BLOOD BY AUTOMATED COUNT: 12.2 % (ref 12.3–15.4)
ETEC LTA+ST1A+ST1B TOX ST NAA+NON-PROBE: NOT DETECTED
G LAMBLIA DNA SPEC QL NAA+PROBE: NOT DETECTED
GFR SERPL CREATININE-BSD FRML MDRD: 86 ML/MIN/1.73
GLOBULIN UR ELPH-MCNC: 3.4 GM/DL
GLUCOSE SERPL-MCNC: 102 MG/DL (ref 65–99)
HCT VFR BLD AUTO: 36.8 % (ref 34–46.6)
HGB BLD-MCNC: 12.3 G/DL (ref 12–15.9)
IMM GRANULOCYTES # BLD AUTO: 0.03 10*3/MM3 (ref 0–0.05)
IMM GRANULOCYTES NFR BLD AUTO: 0.3 % (ref 0–0.5)
LIPASE SERPL-CCNC: 22 U/L (ref 13–60)
LYMPHOCYTES # BLD AUTO: 1.46 10*3/MM3 (ref 0.7–3.1)
LYMPHOCYTES NFR BLD AUTO: 14.4 % (ref 19.6–45.3)
MCH RBC QN AUTO: 30.2 PG (ref 26.6–33)
MCHC RBC AUTO-ENTMCNC: 33.4 G/DL (ref 31.5–35.7)
MCV RBC AUTO: 90.4 FL (ref 79–97)
MONOCYTES # BLD AUTO: 0.64 10*3/MM3 (ref 0.1–0.9)
MONOCYTES NFR BLD AUTO: 6.3 % (ref 5–12)
NEUTROPHILS NFR BLD AUTO: 7.86 10*3/MM3 (ref 1.7–7)
NEUTROPHILS NFR BLD AUTO: 77.3 % (ref 42.7–76)
NOROVIRUS GI+II RNA STL QL NAA+NON-PROBE: NOT DETECTED
NRBC BLD AUTO-RTO: 0 /100 WBC (ref 0–0.2)
P SHIGELLOIDES DNA STL QL NAA+PROBE: NOT DETECTED
PLATELET # BLD AUTO: 203 10*3/MM3 (ref 140–450)
PMV BLD AUTO: 10.5 FL (ref 6–12)
POTASSIUM SERPL-SCNC: 4 MMOL/L (ref 3.5–5.2)
PROT SERPL-MCNC: 7.7 G/DL (ref 6–8.5)
RBC # BLD AUTO: 4.07 10*6/MM3 (ref 3.77–5.28)
RV RNA STL NAA+PROBE: NOT DETECTED
SALMONELLA DNA SPEC QL NAA+PROBE: NOT DETECTED
SAPO I+II+IV+V RNA STL QL NAA+NON-PROBE: NOT DETECTED
SARS-COV-2 RDRP RESP QL NAA+PROBE: NOT DETECTED
SHIGELLA SP+EIEC IPAH STL QL NAA+PROBE: NOT DETECTED
SODIUM SERPL-SCNC: 141 MMOL/L (ref 136–145)
V CHOLERAE DNA SPEC QL NAA+PROBE: NOT DETECTED
VIBRIO DNA SPEC NAA+PROBE: NOT DETECTED
WBC # BLD AUTO: 10.16 10*3/MM3 (ref 3.4–10.8)
YERSINIA STL CULT: NOT DETECTED

## 2020-08-06 PROCEDURE — 25010000002 LEVOFLOXACIN PER 250 MG: Performed by: PHYSICIAN ASSISTANT

## 2020-08-06 PROCEDURE — 96375 TX/PRO/DX INJ NEW DRUG ADDON: CPT

## 2020-08-06 PROCEDURE — 96366 THER/PROPH/DIAG IV INF ADDON: CPT

## 2020-08-06 PROCEDURE — 80053 COMPREHEN METABOLIC PANEL: CPT | Performed by: PHYSICIAN ASSISTANT

## 2020-08-06 PROCEDURE — 87635 SARS-COV-2 COVID-19 AMP PRB: CPT | Performed by: PHYSICIAN ASSISTANT

## 2020-08-06 PROCEDURE — 96365 THER/PROPH/DIAG IV INF INIT: CPT

## 2020-08-06 PROCEDURE — 25010000002 IOPAMIDOL 61 % SOLUTION: Performed by: PHYSICIAN ASSISTANT

## 2020-08-06 PROCEDURE — 83690 ASSAY OF LIPASE: CPT | Performed by: PHYSICIAN ASSISTANT

## 2020-08-06 PROCEDURE — 74177 CT ABD & PELVIS W/CONTRAST: CPT

## 2020-08-06 PROCEDURE — 87493 C DIFF AMPLIFIED PROBE: CPT | Performed by: PHYSICIAN ASSISTANT

## 2020-08-06 PROCEDURE — 99284 EMERGENCY DEPT VISIT MOD MDM: CPT

## 2020-08-06 PROCEDURE — 0097U HC BIOFIRE FILMARRAY GI PANEL: CPT | Performed by: PHYSICIAN ASSISTANT

## 2020-08-06 PROCEDURE — 85025 COMPLETE CBC W/AUTO DIFF WBC: CPT | Performed by: PHYSICIAN ASSISTANT

## 2020-08-06 RX ORDER — SODIUM CHLORIDE 9 MG/ML
125 INJECTION, SOLUTION INTRAVENOUS CONTINUOUS
Status: DISCONTINUED | OUTPATIENT
Start: 2020-08-06 | End: 2020-08-06 | Stop reason: HOSPADM

## 2020-08-06 RX ORDER — AMITRIPTYLINE HYDROCHLORIDE 25 MG/1
25 TABLET, FILM COATED ORAL NIGHTLY
COMMUNITY

## 2020-08-06 RX ORDER — FLUCONAZOLE 150 MG/1
150 TABLET ORAL ONCE
Qty: 1 TABLET | Refills: 0 | Status: SHIPPED | OUTPATIENT
Start: 2020-08-06 | End: 2020-08-06

## 2020-08-06 RX ORDER — LEVOFLOXACIN 5 MG/ML
500 INJECTION, SOLUTION INTRAVENOUS ONCE
Status: COMPLETED | OUTPATIENT
Start: 2020-08-06 | End: 2020-08-06

## 2020-08-06 RX ORDER — SACCHAROMYCES BOULARDII 250 MG
250 CAPSULE ORAL 2 TIMES DAILY
Qty: 20 CAPSULE | Refills: 0 | Status: SHIPPED | OUTPATIENT
Start: 2020-08-06 | End: 2021-04-30

## 2020-08-06 RX ADMIN — METRONIDAZOLE 500 MG: 500 INJECTION, SOLUTION INTRAVENOUS at 13:17

## 2020-08-06 RX ADMIN — LEVOFLOXACIN 500 MG: 5 INJECTION, SOLUTION INTRAVENOUS at 13:05

## 2020-08-06 RX ADMIN — IOPAMIDOL 100 ML: 612 INJECTION, SOLUTION INTRAVENOUS at 12:06

## 2020-08-06 RX ADMIN — SODIUM CHLORIDE 500 ML: 9 INJECTION, SOLUTION INTRAVENOUS at 09:54

## 2020-08-06 RX ADMIN — VANCOMYCIN HYDROCHLORIDE 500 MG: KIT at 14:06

## 2020-08-06 NOTE — ED PROVIDER NOTES
Subjective   History of Present Illness    Patient is a pleasant 44-year-old female chief complaint of lower abdominal pain and fever.  The patient describes her symptoms began about 4 days ago.  She describes it as severe menstrual cramping in her lower abdominal area with a subjectively high fever.  She denies ever taking it measured temperature but did take Tylenol for symptoms.  That did seem to help with subjective fever and minimally with the lower abdominal pain.  She has had 3 loose bowel movements in the past 4 days with the last episode being yesterday.  She denies any blood in her stool.  She has had intermittent scant nausea but none presently.  She spoke with her nurse practitioner sister the other day and was concerned that she may have appendicitis so she was advised to the ER to be further evaluated.    Patient denies ever experiencing pain like this before.  Her last known menstrual cycle was as expected back to in terms of timeframe but lighter in flow.  She does not believe she is pregnant.  She denies any dysuria, hematuria, or flank pain.  She reports he is otherwise healthy.  She has not take any medications aside from Tylenol to alleviate pain.  She has been on antibiotics in the past couple month secondary to urinary tract infection.  She denies previous history of C. difficile colitis.  She denies any recent surgeries or travels.    Review of Systems   Constitutional: Positive for activity change, appetite change, diaphoresis and fever.   HENT: Negative.    Respiratory: Negative.    Cardiovascular: Negative.    Gastrointestinal: Positive for abdominal pain, diarrhea and nausea. Negative for vomiting.   Genitourinary: Positive for menstrual problem. Negative for difficulty urinating, vaginal bleeding, vaginal discharge and vaginal pain.   Musculoskeletal: Negative.    Skin: Negative.    Neurological: Negative.    Psychiatric/Behavioral: Negative.        History reviewed. No pertinent past  "medical history.    Allergies   Allergen Reactions   • Other GI Intolerance     Pt states pain medications (norco, morphine, etc) make her \"violently vomit.\"       Past Surgical History:   Procedure Laterality Date   •  SECTION     • DENTAL PROCEDURE         History reviewed. No pertinent family history.    Social History     Socioeconomic History   • Marital status: Single     Spouse name: Not on file   • Number of children: Not on file   • Years of education: Not on file   • Highest education level: Not on file   Tobacco Use   • Smoking status: Current Every Day Smoker     Types: Electronic Cigarette   Substance and Sexual Activity   • Alcohol use: Not Currently   • Drug use: Never       Prior to Admission medications    Not on File       Medications   sodium chloride 0.9 % infusion (0 mL/hr Intravenous Stopped 20 1216)   metroNIDAZOLE (FLAGYL) 500 mg/100mL IVPB (500 mg Intravenous New Bag 20 1317)   vancomycin oral solution reconstituted 500 mg (has no administration in time range)   sodium chloride 0.9 % bolus 500 mL (0 mL Intravenous Stopped 20 1200)   iopamidol (ISOVUE-300) 61 % injection 100 mL (100 mL Intravenous Given 20 1206)   levoFLOXacin (LEVAQUIN) 500 mg/100 mL D5W (premix) (LEVAQUIN) 500 mg (0 mg Intravenous Stopped 20 1311)       /76   Pulse 80   Temp 98.7 °F (37.1 °C) (Oral)   Resp 16   Ht 162.6 cm (64\")   Wt 62.1 kg (137 lb)   LMP 2020 (Approximate)   SpO2 100%   BMI 23.52 kg/m²       Objective   Physical Exam   Constitutional: She is oriented to person, place, and time. She appears well-developed and well-nourished. No distress.   HENT:   Head: Normocephalic and atraumatic.   Eyes: Pupils are equal, round, and reactive to light. Conjunctivae and EOM are normal.   Neck: Normal range of motion. Neck supple. No tracheal deviation present.   Cardiovascular: Normal rate, regular rhythm, normal heart sounds and intact distal pulses.   No murmur " heard.  Pulmonary/Chest: Effort normal and breath sounds normal.   Abdominal: Soft. Bowel sounds are normal. She exhibits no distension and no mass. There is tenderness in the right lower quadrant, suprapubic area and left lower quadrant. There is no rebound and no guarding.   Musculoskeletal: Normal range of motion. She exhibits no edema.   Neurological: She is alert and oriented to person, place, and time. She has normal reflexes.   Skin: Skin is warm and dry. Capillary refill takes less than 2 seconds. She is not diaphoretic.   Psychiatric: She has a normal mood and affect. Her behavior is normal. Judgment and thought content normal.   Nursing note and vitals reviewed.      Procedures         Lab Results (last 24 hours)     Procedure Component Value Units Date/Time    CBC & Differential [[id number]] Collected:  08/06/20 0948    Specimen:  Blood Updated:  08/06/20 1005    Narrative:       The following orders were created for panel order CBC & Differential.  Procedure                               Abnormality         Status                     ---------                               -----------         ------                     CBC Auto Differential[[id number]]         Abnormal            Final result                 Please view results for these tests on the individual orders.    CBC Auto Differential [[id number]]  (Abnormal) Collected:  08/06/20 0948    Specimen:  Blood Updated:  08/06/20 1005     WBC 10.16 10*3/mm3      RBC 4.07 10*6/mm3      Hemoglobin 12.3 g/dL      Hematocrit 36.8 %      MCV 90.4 fL      MCH 30.2 pg      MCHC 33.4 g/dL      RDW 12.2 %      RDW-SD 40.6 fl      MPV 10.5 fL      Platelets 203 10*3/mm3      Neutrophil % 77.3 %      Lymphocyte % 14.4 %      Monocyte % 6.3 %      Eosinophil % 1.4 %      Basophil % 0.3 %      Immature Grans % 0.3 %      Neutrophils, Absolute 7.86 10*3/mm3      Lymphocytes, Absolute 1.46 10*3/mm3      Monocytes, Absolute 0.64 10*3/mm3      Eosinophils, Absolute 0.14  10*3/mm3      Basophils, Absolute 0.03 10*3/mm3      Immature Grans, Absolute 0.03 10*3/mm3      nRBC 0.0 /100 WBC     Comprehensive Metabolic Panel [[id number]]  (Abnormal) Collected:  08/06/20 0949    Specimen:  Blood Updated:  08/06/20 1024     Glucose 102 mg/dL      BUN 12 mg/dL      Creatinine 0.73 mg/dL      Sodium 141 mmol/L      Potassium 4.0 mmol/L      Chloride 104 mmol/L      CO2 27.0 mmol/L      Calcium 9.9 mg/dL      Total Protein 7.7 g/dL      Albumin 4.30 g/dL      ALT (SGPT) 30 U/L      AST (SGOT) 24 U/L      Alkaline Phosphatase 102 U/L      Total Bilirubin 0.4 mg/dL      eGFR Non African Amer 86 mL/min/1.73      Globulin 3.4 gm/dL      A/G Ratio 1.3 g/dL      BUN/Creatinine Ratio 16.4     Anion Gap 10.0 mmol/L     Narrative:       GFR Normal >60  Chronic Kidney Disease <60  Kidney Failure <15      Lipase [[id number]]  (Normal) Collected:  08/06/20 0949    Specimen:  Blood Updated:  08/06/20 1019     Lipase 22 U/L     COVID PRE-OP / PRE-PROCEDURE SCREENING ORDER (NO ISOLATION) - Swab, Nasopharynx [[id number]] Collected:  08/06/20 0950    Specimen:  Swab from Nasopharynx Updated:  08/06/20 1029    Narrative:       The following orders were created for panel order COVID PRE-OP / PRE-PROCEDURE SCREENING ORDER (NO ISOLATION) - Swab, Nasopharynx.  Procedure                               Abnormality         Status                     ---------                               -----------         ------                     COVID-19, [facility]OTT IN-HOUSE...[[id number]]  Normal              Final result                 Please view results for these tests on the individual orders.    COVID-19, ABBOTT IN-HOUSE,NP Swab (NO TRANSPORT MEDIA) 2 HR TAT - Swab, Nasopharynx [[id number]]  (Normal) Collected:  08/06/20 0950    Specimen:  Swab from Nasopharynx Updated:  08/06/20 1029     COVID19 Not Detected    Narrative:       Fact sheet for providers: https://www.fda.gov/media/549750/download     Fact sheet for patients:  https://www.fda.gov/media/006008/download    Gastrointestinal Panel, PCR - Stool, Per Rectum [[id number]]  (Normal) Collected:  08/06/20 1207    Specimen:  Stool from Per Rectum Updated:  08/06/20 1330     Campylobacter Not Detected     Plesiomonas shigelloides Not Detected     Salmonella Not Detected     Vibrio Not Detected     Vibrio cholerae Not Detected     Yersinia enterocolitica Not Detected     Enteroaggregative E. coli (EAEC) Not Detected     Enteropathogenic E. coli (EPEC) Not Detected     Enterotoxigenic E. coli (ETEC) lt/st Not Detected     Shiga-like toxin-producing E. coli (STEC) stx1/stx2 Not Detected     E. coli O157 Not Detected     Shigella/Enteroinvasive E. coli (EIEC) Not Detected     Cryptosporidium Not Detected     Cyclospora cayetanensis Not Detected     Entamoeba histolytica Not Detected     Giardia lamblia Not Detected     Adenovirus F40/41 Not Detected     Astrovirus Not Detected     Norovirus GI/GII Not Detected     Rotavirus A Not Detected     Sapovirus (I, II, IV or V) Not Detected    Narrative:       If Aeromonas, Staphylococcus aureus or Bacillus cereus are suspected, please order [id number]: Stool Culture, Aeromonas, S aureus, B Cereus.    Clostridium Difficile Toxin - Stool, Per Rectum [[id number]] Collected:  08/06/20 1207    Specimen:  Stool from Per Rectum Updated:  08/06/20 0839    Narrative:       The following orders were created for panel order Clostridium Difficile Toxin - Stool, Per Rectum.  Procedure                               Abnormality         Status                     ---------                               -----------         ------                     Clostridium Difficile Tox...[[id number]]  Abnormal            Final result                 Please view results for these tests on the individual orders.    Clostridium Difficile Toxin, PCR - Stool, Per Rectum [[id number]]  (Abnormal) Collected:  08/06/20 1207    Specimen:  Stool from Per Rectum Updated:  08/06/20 1309     C.  Difficile Toxins by PCR Positive    Narrative:       Performance characteristics of test not established for patients <2 years of age.          Ct Abdomen Pelvis With Contrast    Result Date: 8/6/2020  Narrative: CT abdomen pelvis with IV contrast  Indication: Lower abdominal pain, fever  Comparison: None  DOSE LENGTH PRODUCT: 202 mGy cm. Automated exposure control was also utilized to decrease patient radiation dose.  Findings:  Clear lung bases.  5 mm far LEFT hypodense liver lesion on image 9 is too small to characterize. 3 mm hypodense RIGHT liver lesion on image 13 is too small to characterize. Gallbladder and biliary tree appear unremarkable. Pancreas, spleen, and adrenal glands appear unremarkable. No solid renal mass. No urolithiasis or hydronephrosis. No focal urinary bladder wall thickening. Mild pericystic inflammatory stranding.  4.5 cm long segment of severely inflamed proximal sigmoid colon in a background of colonic diverticula. No extraluminal gas or drainable organized fluid collection. No evidence of bowel obstruction. Appendix appears normal.  No ascites or free pelvic fluid. No pelvic mass or pelvic collection. Uterus and adnexa appear unremarkable.  Normal caliber abdominal aorta. Patent SMA. No enlarged retroperitoneal, mesenteric, pelvic, or inguinal lymph nodes. No acute osseous findings.      Impression: Impression:  Acute sigmoid diverticulitis. Severe surrounding inflammatory stranding/phlegmon. No evidence of macro perforation or organized drainable fluid collection/abscess. Recommend follow-up to document resolution and exclude underlying mass as a neoplastic process could present in a similar manner. This report was finalized on 08/06/2020 12:29 by Dr. Sloan Bear MD.      ED Course  ED Course as of Aug 06 1337   Thu Aug 06, 2020   1331 Patient has been educated of the abnormal CT findings consistent with acute sigmoid diverticulitis.  She does have severe surrounding inflammatory  stranding/phlegmon.  No evidence of macro perforation organize drainable fluid collection/abscess.  She has been advised to follow-up with this document resolution and exclude underlying mass as a neoplastic process.Patient's laboratory data is unremarkable.  She is positive for C. difficile toxin.  She has been given Flagyl IV here I will send her home with vancomycin oral.  Information for gastroenterologist will be given as well.  Recommend probiotics.    [TK]   1159 Throughout the patient's ER stay, I did offer pain medication but the patient declines.    [TK]      ED Course User Index  [TK] José Miguel Harris PA          [facility]    Final diagnoses:   C. difficile colitis   Diverticulitis of large intestine without perforation or abscess without bleeding   Abnormal CT of the abdomen          José Miguel Harris PA  08/06/20 6723

## 2020-08-06 NOTE — ED NOTES
Patient has not had a bowel movement since yesterday, she knows that we need a specimen if she can provide one     Lilli Cristobal RN  08/06/20 5070

## 2020-08-07 ENCOUNTER — TELEPHONE (OUTPATIENT)
Dept: EMERGENCY DEPT | Facility: HOSPITAL | Age: 45
End: 2020-08-07

## 2020-08-10 ENCOUNTER — OFFICE VISIT (OUTPATIENT)
Dept: PRIMARY CARE CLINIC | Age: 45
End: 2020-08-10
Payer: COMMERCIAL

## 2020-08-10 VITALS
TEMPERATURE: 98 F | WEIGHT: 134.8 LBS | DIASTOLIC BLOOD PRESSURE: 88 MMHG | HEART RATE: 94 BPM | HEIGHT: 64 IN | SYSTOLIC BLOOD PRESSURE: 104 MMHG | OXYGEN SATURATION: 96 % | BODY MASS INDEX: 23.01 KG/M2 | RESPIRATION RATE: 16 BRPM

## 2020-08-10 PROCEDURE — 99214 OFFICE O/P EST MOD 30 MIN: CPT | Performed by: FAMILY MEDICINE

## 2020-08-10 RX ORDER — SACCHAROMYCES BOULARDII 250 MG
250 CAPSULE ORAL 2 TIMES DAILY
COMMUNITY
End: 2020-10-20 | Stop reason: ALTCHOICE

## 2020-08-14 ENCOUNTER — OFFICE VISIT (OUTPATIENT)
Dept: GASTROENTEROLOGY | Age: 45
End: 2020-08-14
Payer: COMMERCIAL

## 2020-08-14 VITALS
HEIGHT: 64 IN | HEART RATE: 82 BPM | OXYGEN SATURATION: 94 % | DIASTOLIC BLOOD PRESSURE: 80 MMHG | SYSTOLIC BLOOD PRESSURE: 100 MMHG | WEIGHT: 135.8 LBS | BODY MASS INDEX: 23.18 KG/M2

## 2020-08-14 PROCEDURE — 99204 OFFICE O/P NEW MOD 45 MIN: CPT | Performed by: NURSE PRACTITIONER

## 2020-08-14 ASSESSMENT — ENCOUNTER SYMPTOMS
ABDOMINAL DISTENTION: 0
CONSTIPATION: 0
CHOKING: 0
COUGH: 0
SHORTNESS OF BREATH: 0
ANAL BLEEDING: 0
DIARRHEA: 0
NAUSEA: 0
VOMITING: 0
TROUBLE SWALLOWING: 0
ABDOMINAL PAIN: 0
RECTAL PAIN: 0
BLOOD IN STOOL: 0

## 2020-08-14 NOTE — PATIENT INSTRUCTIONS
Continue Vancomycin    Schedule colonoscopy. No aspirin, ibuprofen, naproxen, fish oil or vitamin E for 5 days before procedure. Do not eat or drink after midnight the day of the procedure. Allowed medications can be taken with a small sip of water. Please review your prep instructions for allowed medications. You will not be able to drive for 24 hours after the procedure due to sedation. You must have a responsible adult, 25 year or older, to accompany you and drive you home the day of your procedure. If you are on blood thinners, clearance from the prescribing physician will be obtained before your procedure is scheduled. If it is determined it is not safe to hold these medications for a short time an alternative procedure for evaluation may be recommended. Risks of colonoscopy include, but are not limited to, perforation, bleeding, and infection, Risk of perforation and bleeding are increased if there is a polyp removed. Anesthesia risks will be reviewed with you before the procedure by a member of the anesthesia department. Your physician may also schedule a follow up appointment with the nurse practitioner to discuss pathology, symptoms or to check if you have had any problems related to your procedure. If you prefer not to return to the office after your procedure please discuss this with your physician on the day of your colonoscopy. The physician will talk with you and/or your family after the procedure is completed. Final recommendations are based on the pathologist report if biopsies or specimens are taken. If polyps are removed during the procedure they will be sent to a pathologist for analysis. Unless you have a follow up appointment scheduled, you will be notified by mail of the pathology results within 4 weeks. If you have not received results after 4 weeks you may call the office to obtain this information. For Colonoscopy:   You will be given specific before the colonoscopy, continue to drink plenty of clear fluids. It is important   to keep yourself hydrated before the exam.     Please follow all instructions as provided for cleansing the bowel. Failure to have an adequately prepped colon may cause you to have incomplete exam with further testing required.      [email or web address]

## 2020-08-14 NOTE — PROGRESS NOTES
Subjective:     Patient ID: Lolis Keenan is a 38 y.o. female  PCP: Anuradha Cho MD  Referring Provider: Amanda OAKLEY  Patient presents to the office today with the following complaints: Diverticulitis    Pt here for + c-diff 8/6/2020 and acute diverticulitis. She was seen at [facility] on 8/6/2020 for fever, loss of appetite, or abdominal pain. Pain was located periumbilical.  She reports one episode of diarrhea prior to this. Today, she reports pain has resolved. She is currently taking Vancomycin for the C-diff. Denies any diarrhea and fevers. She does report more loose stools. She states no BM yesterday. Daily BM is normal for her. Denies any blood in stool. She is questioning the diagnosis of C-diff. No previous history of colonoscopy  Denies any family history of colon cancer or colon polyps      CT abdomen pelvis with IV contrast  Indication: Lower abdominal pain, fever  Comparison: None  DOSE LENGTH PRODUCT: 202 mGy cm. Automated exposure control was also  utilized to decrease patient radiation dose. Findings:  Clear lung bases. 5 mm far LEFT hypodense liver lesion on image 9 is too small to  characterize. 3 mm hypodense RIGHT liver lesion on image 13 is too small  to characterize. Gallbladder and biliary tree appear unremarkable. Pancreas, spleen, and adrenal glands appear unremarkable. No solid renal  mass. No urolithiasis or hydronephrosis. No focal urinary bladder wall  thickening. Mild pericystic inflammatory stranding. 4.5 cm long segment of severely inflamed proximal sigmoid colon in a  background of colonic diverticula. No extraluminal gas or drainable  organized fluid collection. No evidence of bowel obstruction. Appendix  appears normal.  No ascites or free pelvic fluid. No pelvic mass or pelvic collection. Uterus and adnexa appear unremarkable. Normal caliber abdominal aorta. Patent SMA.  No enlarged retroperitoneal,  mesenteric, pelvic, or inguinal lymph nodes. No acute osseous findings. IMPRESSION:  Acute sigmoid diverticulitis. Severe surrounding inflammatory  stranding/phlegmon. No evidence of macro perforation or organized  drainable fluid collection/abscess. Recommend follow-up to document  resolution and exclude underlying mass as a neoplastic process could  present in a similar manner. This report was finalized on 2020 12:29 by Dr. Edmundo Stephen    This was my first time assessing Ms. Nuñez    Assessment:     1. Abnormal CT of the abdomen    2. Diverticulitis    3. Clostridioides difficile infection        Plan:   - Continue Vancomycin as ordered  - [facility] for GI panel, per patient request  - Schedule colonoscopy to be scheduled in 6 weeks   Instruct on bowel prep. Nothing to eat or drink after midnight the day of the exam.  Unable to drive for 24 hours after the procedure. No aspirin or nonsteroidal anti-inflammatories for 5 days before procedure. I have discussed the benefits, alternatives, and risks (including bleeding, perforation and death)  for pursuing Endoscopy (EGD/Colonscopy/EUS/ERCP) with the patient and they are willing to continue. We also discussed the need for anesthesia, IV access, proper dietary changes, medication changes if necessary, and need for bowel prep (if ordered) prior to their Endoscopic procedure. They are aware they must have someone accompany them to their scheduled procedure to drive them home - they agree to the above and are willing to continue. Orders  No orders of the defined types were placed in this encounter. Medications  No orders of the defined types were placed in this encounter.         Patient History:     Past Medical History:   Diagnosis Date    C. difficile colitis     Carpal tunnel syndrome on right     Depression     Diverticulitis     Fibromyalgia     Headache(784.0)     Irritable bowel syndrome        Past Surgical History:   Procedure Laterality Date     SECTION x 2    MOUTH SURGERY         Family History   Problem Relation Age of Onset    Lung Cancer Mother     Heart Disease Father     Hypertension Brother     Colon Cancer Neg Hx     Colon Polyps Neg Hx     Esophageal Cancer Neg Hx     Liver Cancer Neg Hx     Liver Disease Neg Hx     Rectal Cancer Neg Hx     Stomach Cancer Neg Hx        Social History     Socioeconomic History    Marital status:      Spouse name: None    Number of children: None    Years of education: None    Highest education level: None   Occupational History    None   Social Needs    Financial resource strain: None    Food insecurity     Worry: None     Inability: None    Transportation needs     Medical: None     Non-medical: None   Tobacco Use    Smoking status: Former Smoker     Packs/day: 0.50     Years: 30.00     Pack years: 15.00     Types: Cigarettes     Last attempt to quit: 2018     Years since quittin.5    Smokeless tobacco: Never Used   Substance and Sexual Activity    Alcohol use: No    Drug use: Never    Sexual activity: None   Lifestyle    Physical activity     Days per week: None     Minutes per session: None    Stress: None   Relationships    Social connections     Talks on phone: None     Gets together: None     Attends Zoroastrianism service: None     Active member of club or organization: None     Attends meetings of clubs or organizations: None     Relationship status: None    Intimate partner violence     Fear of current or ex partner: None     Emotionally abused: None     Physically abused: None     Forced sexual activity: None   Other Topics Concern    None   Social History Narrative    None       Current Outpatient Medications   Medication Sig Dispense Refill    Vancomycin 25 MG/ML SOLR Take by mouth      saccharomyces boulardii (FLORASTOR) 250 MG capsule Take 250 mg by mouth 2 times daily      Multiple Vitamins-Minerals (ONE DAILY MULTIVITAMIN WOMEN PO) Take by mouth      amitriptyline (ELAVIL) 50 MG tablet Take 1 tablet by mouth nightly 90 tablet 3    gabapentin (NEURONTIN) 400 MG capsule Take 1 capsule by mouth 3 times daily for 30 days. 90 capsule 0    albuterol sulfate HFA (VENTOLIN HFA) 108 (90 Base) MCG/ACT inhaler Inhale 2 puffs into the lungs every 6 hours as needed for Wheezing 1 Inhaler 3    cyclobenzaprine (FLEXERIL) 10 MG tablet TAKE ONE TABLET BY MOUTH 3 TIMES A DAY AS NEEDED FOR MUSCLES SPASMS 90 tablet 2     No current facility-administered medications for this visit. Allergies   Allergen Reactions    Codeine      Or synth.  Ms Sreedhar Brown Sulfate]        Review of Systems   Constitutional: Negative for activity change, appetite change, fatigue, fever and unexpected weight change. HENT: Negative for trouble swallowing. Respiratory: Negative for cough, choking and shortness of breath. Cardiovascular: Negative for chest pain. Gastrointestinal: Negative for abdominal distention, abdominal pain (resolved), anal bleeding, blood in stool, constipation, diarrhea (\"loose\"), nausea, rectal pain and vomiting. Allergic/Immunologic: Negative for food allergies. All other systems reviewed and are negative. Objective:     /80 (Site: Left Upper Arm, Position: Sitting, Cuff Size: Medium Adult)   Pulse 82   Ht 5' 4\" (1.626 m)   Wt 135 lb 12.8 oz (61.6 kg)   SpO2 94%   BMI 23.31 kg/m²     Physical Exam  Vitals signs reviewed. Constitutional:       General: She is not in acute distress. Appearance: She is well-developed. HENT:      Head: Normocephalic and atraumatic. Right Ear: External ear normal.      Left Ear: External ear normal.      Nose: Nose normal.      Comments: Mask on     Mouth/Throat:      Comments: Mask on    Eyes:      Conjunctiva/sclera: Conjunctivae normal.      Pupils: Pupils are equal, round, and reactive to light. Neck:      Musculoskeletal: Normal range of motion and neck supple.    Cardiovascular: Rate and Rhythm: Normal rate and regular rhythm. Heart sounds: Normal heart sounds. No murmur. No friction rub. No gallop. Pulmonary:      Effort: Pulmonary effort is normal. No respiratory distress. Breath sounds: Normal breath sounds. Abdominal:      General: Bowel sounds are normal. There is no distension. Palpations: Abdomen is soft. There is no mass. Tenderness: There is abdominal tenderness in the left lower quadrant. There is no guarding or rebound. Musculoskeletal: Normal range of motion. Skin:     General: Skin is warm and dry. Findings: No rash. Nails: There is no clubbing. Neurological:      Mental Status: She is alert and oriented to person, place, and time. Gait: Gait normal.   Psychiatric:         Behavior: Behavior normal.         Thought Content:  Thought content normal.

## 2020-08-19 ASSESSMENT — ENCOUNTER SYMPTOMS
COLOR CHANGE: 0
EYE DISCHARGE: 0
NAUSEA: 0
BACK PAIN: 0
DIARRHEA: 0
ABDOMINAL PAIN: 1
WHEEZING: 0
VOMITING: 0
COUGH: 0

## 2020-08-19 NOTE — PROGRESS NOTES
SUBJECTIVE:    Patient ID: Desiree Henderson is a 38 y.o. female. HPI:   Patient is here today because she states that she would like a referral to gastroenterology. She states that she went to the ER and was told that she had diverticulitis as well as C. difficile. She states that she developed lower abdominal pain and fever. She states that she went to the ER because it got very severe. She states that she did take some Tylenol which seemed to help some. She had had a few loose bowel movements over a couple days span. She has not had any blood in her stool. They did do a CT scan of her abdomen which showed some diverticulitis. She states they have been collected a stool sample which was formed at that point and told her that she was positive for C. difficile. She has not had any episodes of explosive diarrhea. She denies any foul odor to her stools. She states that they did go ahead and discharge her home on antibiotics which she has been taking. She states that overall she feels better but is still having some slight lower abdominal discomfort. She states that for the most part though her symptoms have resolved. She denies any recurrent fever. She denies any nausea or vomiting. She was seen at [facility] ER and I have reviewed those records today.      Past Medical History:   Diagnosis Date    C. difficile colitis     Carpal tunnel syndrome on right     Depression     Diverticulitis     Fibromyalgia     Headache(784.0)     Irritable bowel syndrome       Current Outpatient Medications   Medication Sig Dispense Refill    Vancomycin 25 MG/ML SOLR Take by mouth      saccharomyces boulardii (FLORASTOR) 250 MG capsule Take 250 mg by mouth 2 times daily      Multiple Vitamins-Minerals (ONE DAILY MULTIVITAMIN WOMEN PO) Take by mouth      amitriptyline (ELAVIL) 50 MG tablet Take 1 tablet by mouth nightly 90 tablet 3    gabapentin (NEURONTIN) 400 MG capsule Take 1 capsule by mouth 3 times daily for 30 days. 90 capsule 0    albuterol sulfate HFA (VENTOLIN HFA) 108 (90 Base) MCG/ACT inhaler Inhale 2 puffs into the lungs every 6 hours as needed for Wheezing 1 Inhaler 3    cyclobenzaprine (FLEXERIL) 10 MG tablet TAKE ONE TABLET BY MOUTH 3 TIMES A DAY AS NEEDED FOR MUSCLES SPASMS 90 tablet 2     No current facility-administered medications for this visit. Allergies   Allergen Reactions    Codeine      Or synth.  Ms Sreedhar Romano Crenikki Sulfate]        Review of Systems   Constitutional: Negative for activity change, appetite change and fever. HENT: Negative for congestion and nosebleeds. Eyes: Negative for discharge. Respiratory: Negative for cough and wheezing. Cardiovascular: Negative for chest pain and leg swelling. Gastrointestinal: Positive for abdominal pain. Negative for diarrhea, nausea and vomiting. Genitourinary: Negative for difficulty urinating, frequency and urgency. Musculoskeletal: Negative for back pain and gait problem. Skin: Negative for color change and rash. Neurological: Negative for dizziness and headaches. Hematological: Does not bruise/bleed easily. Psychiatric/Behavioral: Negative for sleep disturbance and suicidal ideas. OBJECTIVE:     Physical Exam  Vitals signs reviewed. Constitutional:       General: She is not in acute distress. Appearance: Normal appearance. She is well-developed. She is not diaphoretic. HENT:      Head: Normocephalic and atraumatic. Right Ear: External ear normal.      Left Ear: External ear normal.   Neck:      Musculoskeletal: Normal range of motion and neck supple. Cardiovascular:      Rate and Rhythm: Normal rate and regular rhythm. Pulses: Normal pulses. Heart sounds: Normal heart sounds. No murmur. Pulmonary:      Effort: Pulmonary effort is normal. No respiratory distress. Breath sounds: Normal breath sounds. Skin:     General: Skin is warm and dry.    Neurological:      General: No focal deficit present. Mental Status: She is alert and oriented to person, place, and time. Mental status is at baseline. Psychiatric:         Mood and Affect: Mood normal.         Behavior: Behavior normal.         Thought Content: Thought content normal.         Judgment: Judgment normal.        /88 (Site: Left Upper Arm, Position: Sitting, Cuff Size: Medium Adult)   Pulse 94   Temp 98 °F (36.7 °C) (Temporal)   Resp 16   Ht 5' 4\" (1.626 m)   Wt 134 lb 12.8 oz (61.1 kg)   SpO2 96%   BMI 23.14 kg/m²      ASSESSMENT:    Clarke Preston was seen today for referral - general.    Diagnoses and all orders for this visit:    Acute diverticulitis  -     Dakota Youngblood MD, Gastroenterology, [facility]. difficile diarrhea  -     Dakota Youngblood MD, Gastroenterology, [place]        PLAN:    I have put a referral in for GI for further work-up and evaluation due to this being a new onset of diverticulitis. I do not feel like she likely has C. difficile at this point especially since her stools are formed and she is no longer having issues with diarrhea. However I encouraged her to go ahead and finish out her antibiotics. She is to follow-up with us if symptoms or not improving. EMR Dragon/transcription disclaimer:  Much of this encounter note is electronic transcription/translation of spoken language toprinted texts. The electronic translation of spoken language may be erroneous, or at times, nonsensical words or phrases may be inadvertently transcribed.   Although I have reviewed the note for such errors, some may stillexist.

## 2020-08-20 ENCOUNTER — TELEPHONE (OUTPATIENT)
Dept: GASTROENTEROLOGY | Age: 45
End: 2020-08-20

## 2020-08-20 NOTE — TELEPHONE ENCOUNTER
8/20/20  Tried calling pt to let her know that the stool test was negative for anything infectious. No vm set up. Could not leave ms.

## 2020-09-11 ENCOUNTER — NURSE ONLY (OUTPATIENT)
Dept: PRIMARY CARE CLINIC | Age: 45
End: 2020-09-11
Payer: COMMERCIAL

## 2020-09-11 LAB
BILIRUBIN, POC: NORMAL
BLOOD URINE, POC: NORMAL
CLARITY, POC: NORMAL
COLOR, POC: YELLOW
GLUCOSE URINE, POC: NORMAL
KETONES, POC: NORMAL
LEUKOCYTE EST, POC: NORMAL
NITRITE, POC: NORMAL
PH, POC: 7
PROTEIN, POC: NORMAL
SPECIFIC GRAVITY, POC: 1
UROBILINOGEN, POC: NORMAL

## 2020-09-11 PROCEDURE — 81002 URINALYSIS NONAUTO W/O SCOPE: CPT | Performed by: FAMILY MEDICINE

## 2020-09-18 ENCOUNTER — OFFICE VISIT (OUTPATIENT)
Age: 45
End: 2020-09-18

## 2020-09-18 VITALS — HEART RATE: 86 BPM | OXYGEN SATURATION: 99 % | TEMPERATURE: 97.1 F

## 2020-09-18 PROCEDURE — 99999 PR OFFICE/OUTPT VISIT,PROCEDURE ONLY: CPT | Performed by: NURSE PRACTITIONER

## 2020-09-20 LAB — SARS-COV-2, NAA: NOT DETECTED

## 2020-09-22 ENCOUNTER — ANESTHESIA EVENT (OUTPATIENT)
Dept: OPERATING ROOM | Age: 45
End: 2020-09-22

## 2020-09-22 ENCOUNTER — APPOINTMENT (OUTPATIENT)
Dept: OPERATING ROOM | Age: 45
End: 2020-09-22

## 2020-09-22 ENCOUNTER — HOSPITAL ENCOUNTER (OUTPATIENT)
Age: 45
Setting detail: OUTPATIENT SURGERY
Discharge: HOME | End: 2020-09-22
Attending: INTERNAL MEDICINE | Admitting: INTERNAL MEDICINE
Payer: COMMERCIAL

## 2020-09-22 ENCOUNTER — HOSPITAL ENCOUNTER (OUTPATIENT)
Age: 45
Setting detail: SPECIMEN
Discharge: HOME | End: 2020-09-22
Payer: COMMERCIAL

## 2020-09-22 ENCOUNTER — ANESTHESIA (OUTPATIENT)
Dept: OPERATING ROOM | Age: 45
End: 2020-09-22

## 2020-09-22 VITALS
HEIGHT: 64 IN | TEMPERATURE: 99.1 F | WEIGHT: 136 LBS | BODY MASS INDEX: 23.22 KG/M2 | RESPIRATION RATE: 18 BRPM | DIASTOLIC BLOOD PRESSURE: 57 MMHG | OXYGEN SATURATION: 100 % | SYSTOLIC BLOOD PRESSURE: 100 MMHG | HEART RATE: 78 BPM

## 2020-09-22 VITALS — OXYGEN SATURATION: 99 % | SYSTOLIC BLOOD PRESSURE: 111 MMHG | DIASTOLIC BLOOD PRESSURE: 78 MMHG

## 2020-09-22 PROCEDURE — 45380 COLONOSCOPY AND BIOPSY: CPT

## 2020-09-22 PROCEDURE — 88305 TISSUE EXAM BY PATHOLOGIST: CPT

## 2020-09-22 PROCEDURE — 45378 DIAGNOSTIC COLONOSCOPY: CPT | Performed by: INTERNAL MEDICINE

## 2020-09-22 RX ORDER — LIDOCAINE HYDROCHLORIDE 10 MG/ML
INJECTION, SOLUTION INFILTRATION; PERINEURAL PRN
Status: DISCONTINUED | OUTPATIENT
Start: 2020-09-22 | End: 2020-09-22 | Stop reason: SDUPTHER

## 2020-09-22 RX ORDER — SODIUM CHLORIDE 9 MG/ML
INJECTION, SOLUTION INTRAVENOUS CONTINUOUS
Status: DISCONTINUED | OUTPATIENT
Start: 2020-09-22 | End: 2020-09-22 | Stop reason: HOSPADM

## 2020-09-22 RX ORDER — PROPOFOL 10 MG/ML
INJECTION, EMULSION INTRAVENOUS PRN
Status: DISCONTINUED | OUTPATIENT
Start: 2020-09-22 | End: 2020-09-22 | Stop reason: SDUPTHER

## 2020-09-22 RX ADMIN — PROPOFOL 250 MG: 10 INJECTION, EMULSION INTRAVENOUS at 11:16

## 2020-09-22 RX ADMIN — LIDOCAINE HYDROCHLORIDE 30 MG: 10 INJECTION, SOLUTION INFILTRATION; PERINEURAL at 11:16

## 2020-09-22 RX ADMIN — SODIUM CHLORIDE: 9 INJECTION, SOLUTION INTRAVENOUS at 10:29

## 2020-09-22 NOTE — OP NOTE
Patient: Austin Dillard : 1975  [facility] Rec#: [id number] Acc#: [id number]   Primary Care Provider Brenda Russell MD    Date of Procedure:  2020    Endoscopist: Damaris Guzmán MD    Referring Provider: Brenda Russell MD,     Operation Performed: Colonoscopy up to the terminal ileum with cold biopsies    Indications:   1. Abnormal CT of the abdomen    2. Diverticulitis    3. Clostridioides difficile infection      Anesthesia:  Sedation was administered by anesthesia who monitored the patient during the procedure. I met with Austin Dillard prior to procedure. We discussed the procedure itself, and I have discussed the risks of endoscopy (including-- but not limited to-- pain, discomfort, bleeding potentially requiring second endoscopic procedure and/or blood transfusion, organ perforation requiring operative repair, damage to organs near the colon, infection, aspiration, cardiopulmonary/allergic reaction), benefits, indications to endoscopy. Additionally, we discussed options other than colonoscopy. The patient expressed understanding. All questions answered. The patient decided to proceed with the procedure. Signed informed consent was placed on the chart. Blood Loss: minimal    Withdrawal time: >6 mins  Bowel Prep: adequate     Complications: no immediate complications    DESCRIPTION OF PROCEDURE:     A time out was performed. After written informed consent was obtained, the patient was placed in the left lateral position. The perianal area was inspected, and a digital rectal exam was performed. A rectal exam was performed: normal tone, no palpable lesions. At this point, a forward viewing Olympus colonoscope was inserted into the anus and carefully advanced to the terminal ileum. The cecum was identified by the ileocecal valve and the appendiceal orifice.  The colonoscope was then slowly withdrawn with careful inspection of the mucosa in a linear and circumferential fashion. The scope was retroflexed in the rectum. Suction was utilized during the procedure to remove as much air as possible from the bowel. The colonoscope was removed from the patient, and the procedure was terminated. Findings are listed below. Findings:   Few small, 1-2 mm erosions in the distal rectum-likely prep related; no mucosal friability or plaques or overt evidence of C.difficile type colitis or IBD. NO large polyps or masses or strictures or colitis or ileitis otherwise; random biopsies were taken including from the distal rectum to check for any microscopic colitis. Moderate Diverticulosis in the left colon    Where it was clearly visible, the mucosa appeared normal throughout the entire examined colon  Retroflexion in the rectum was otherwise normal and revealed no further abnormalities. Recommendations:  1. Repeat colonoscopy: pending pathology - for CRCS in 5 years. 2. Await biopsy results-you will receive a letter with your results within 7-10 days    - Resume previous meds and diet. - GI clinic f/u 4-6 weeks. - Keep scheduled f/u appts with other MDs.     - NO ASA/NSAIDs x 2 weeks. Findings and recommendations were discussed w/ the patient. A copy of the images was provided.     Claire Bailey MD  9/22/2020  11:18 AM

## 2020-09-22 NOTE — ANESTHESIA POSTPROCEDURE EVALUATION
Department of Anesthesiology  Postprocedure Note    Patient: Arabella Edwards  MRN: [id number]  YOB: 1975  Date of evaluation: 9/22/2020  Time:  11:19 AM     Procedure Summary     Date:  09/22/20 Room / Location:  [facility] ENDO 02 / [address]    Anesthesia Start:  9810 Anesthesia Stop:      Procedure:  COLONOSCOPY DIAGNOSTIC (N/A Abdomen) Diagnosis:  (ABNORMAL CT, HX DIVERTICULITS)    Surgeon:  Reginaldo Warren MD Responsible Provider:      Anesthesia Type:  general, TIVA ASA Status:  2          Anesthesia Type: general, TIVA    Teo Phase I:      Teo Phase II:      Last vitals: Reviewed and per EMR flowsheets.        Anesthesia Post Evaluation    Patient location during evaluation: bedside  Patient participation: complete - patient participated  Level of consciousness: sleepy but conscious  Pain score: 0  Airway patency: patent  Nausea & Vomiting: no nausea and no vomiting  Complications: no  Cardiovascular status: blood pressure returned to baseline  Respiratory status: acceptable, room air and spontaneous ventilation  Hydration status: euvolemic

## 2020-09-22 NOTE — H&P
Patient Name: Cindy Silva  : 1975  MRN: [id number]  DATE: 20    Allergies: Allergies   Allergen Reactions    Codeine      Or synth.  Ms Contin [Morphine Sulfate]         ENDOSCOPY  History and Physical    Procedure:    [x] Diagnostic Colonoscopy       [] Screening Colonoscopy  [] EGD      [] ERCP      [] EUS       [] Other    [x] Previous office notes/History and Physical reviewed from the patients chart. Please see EMR for further details of HPI. I have examined the patient's status immediately prior to the procedure and:      Indications/HPI:     1. Abnormal CT of the abdomen    2. Diverticulitis    3. Clostridioides difficile infection      []Abdominal Pain   []Cancer- GI/Lung     []Fhx of colon CA/polyps  []History of Polyps  []Barretts            []Melena  []Abnormal Imaging              []Dysphagia              []Persistent Pneumonia   []Anemia                            []Food Impaction        []History of Polyps  [] GI Bleed             []Pulmonary nodule/Mass   []Change in bowel habits []Heartburn/Reflux  []Rectal Bleed (BRBPR)  []Chest Pain - Non Cardiac []Heme (+) Stool []Ulcers  []Constipation  []Hemoptysis  []Varices  []Diarrhea  []Hypoxemia    []Nausea/Vomiting   []Screening   []Crohns/Colitis  []Other:     Anesthesia:   [x] MAC [] Moderate Sedation   [] General   [] None     ROS: 12 pt Review of Symptoms was negative unless mentioned above    Medications:   Prior to Admission medications    Medication Sig Start Date End Date Taking? Authorizing Provider   saccharomyces boulardii (FLORASTOR) 250 MG capsule Take 250 mg by mouth 2 times daily    Historical Provider, MD   Multiple Vitamins-Minerals (ONE DAILY MULTIVITAMIN WOMEN PO) Take by mouth    Historical Provider, MD   amitriptyline (ELAVIL) 50 MG tablet Take 1 tablet by mouth nightly 20   Mecca Schafer MD   gabapentin (NEURONTIN) 400 MG capsule Take 1 capsule by mouth 3 times daily for 30 days.  19 20  Shama Burton MD   albuterol sulfate HFA (VENTOLIN HFA) 108 (90 Base) MCG/ACT inhaler Inhale 2 puffs into the lungs every 6 hours as needed for Wheezing 3/19/18   Shama Burton MD   cyclobenzaprine (FLEXERIL) 10 MG tablet TAKE ONE TABLET BY MOUTH 3 TIMES A DAY AS NEEDED FOR MUSCLES SPASMS 3/17/17   Alia Taylor, APRN - CNP       Past Medical History:  Past Medical History:   Diagnosis Date    C. difficile colitis     Carpal tunnel syndrome on right     Depression     Diverticulitis     Fibromyalgia     Headache(784.0)     History of blood transfusion     Irritable bowel syndrome        Past Surgical History:  Past Surgical History:   Procedure Laterality Date     SECTION      x 2    MOUTH SURGERY         Social History:  Social History     Tobacco Use    Smoking status: Former Smoker     Packs/day: 0.50     Years: 30.00     Pack years: 15.00     Types: Cigarettes     Last attempt to quit: 2018     Years since quittin.6    Smokeless tobacco: Never Used   Substance Use Topics    Alcohol use: No    Drug use: Never       Vital Signs:   Vitals:    20 0946   BP: 110/62   Pulse: 80   Resp: 18   Temp: 99.1 °F (37.3 °C)   SpO2: 100%        Physical Exam:  Cardiac:  [x]WNL  []Comments:  Pulmonary:  [x]WNL   []Comments:  Neuro/Mental Status:  [x]WNL  []Comments:  Abdominal:  [x]WNL    []Comments:  Other:   []WNL  []Comments:    Informed Consent:  The risks and benefits of the procedure have been discussed with either the patient or if they cannot consent, their representative. Assessment:  Patient examined and appropriate for planned sedation and procedure. Plan:  Proceed with planned sedation and procedure as above.          Kal Jc MD

## 2020-09-22 NOTE — ANESTHESIA PRE PROCEDURE
Fibromyalgia     Headache(784.0)     History of blood transfusion     Irritable bowel syndrome        Past Surgical History:        Procedure Laterality Date     SECTION      x 2    MOUTH SURGERY         Social History:    Social History     Tobacco Use    Smoking status: Former Smoker     Packs/day: 0.50     Years: 30.00     Pack years: 15.00     Types: Cigarettes     Last attempt to quit: 2018     Years since quittin.6    Smokeless tobacco: Never Used   Substance Use Topics    Alcohol use: No                                Counseling given: Not Answered      Vital Signs (Current): There were no vitals filed for this visit.                                            BP Readings from Last 3 Encounters:   20 100/80   08/10/20 104/88   20 104/88       NPO Status: Time of last liquid consumption: 520                        Time of last solid consumption:                         Date of last liquid consumption: 20                        Date of last solid food consumption: 20    BMI:   Wt Readings from Last 3 Encounters:   20 135 lb 12.8 oz (61.6 kg)   08/10/20 134 lb 12.8 oz (61.1 kg)   20 140 lb 3.2 oz (63.6 kg)     There is no height or weight on file to calculate BMI.    CBC:   Lab Results   Component Value Date    WBC 6.9 2019    RBC 4.74 2019    HGB 14.2 2019    HCT 44.9 2019    MCV 94.7 2019    RDW 12.6 2019     2019       CMP:   Lab Results   Component Value Date     2019    K 4.6 2019     2019    CO2 21 2019    BUN 11 2019    CREATININE 0.8 2019    GFRAA 66 2016    AGRATIO 1.3 2016    LABGLOM >60 2019    GLUCOSE 85 2019    PROT 8.4 2019    CALCIUM 10.3 2019    BILITOT <0.2 2019    ALKPHOS 73 2019    AST 12 2019    ALT 6 2019       POC Tests: No results for input(s): POCGLU, POCNA, POCK, POCCL, Phuong Cruz, POCHCT in the last 72 hours. Coags: No results found for: PROTIME, INR, APTT    HCG (If Applicable): No results found for: PREGTESTUR, PREGSERUM, HCG, HCGQUANT     ABGs: No results found for: PHART, PO2ART, FDE3ZMC, JOQ4VCP, BEART, R6QGOPFJ     Type & Screen (If Applicable):  No results found for: LABABO, LABRH    Drug/Infectious Status (If Applicable):  No results found for: HIV, HEPCAB    COVID-19 Screening (If Applicable):   Lab Results   Component Value Date    COVID19 NOT DETECTED 09/18/2020         Anesthesia Evaluation  Patient summary reviewed and Nursing notes reviewed  Airway: Mallampati: II  TM distance: >3 FB   Neck ROM: full  Mouth opening: > = 3 FB Dental: normal exam         Pulmonary:Negative Pulmonary ROS and normal exam                               Cardiovascular:Negative CV ROS             Beta Blocker:  Not on Beta Blocker         Neuro/Psych:   (+) neuromuscular disease:, headaches: migraine headaches, psychiatric history:            GI/Hepatic/Renal: Neg GI/Hepatic/Renal ROS            Endo/Other: Negative Endo/Other ROS                    Abdominal:           Vascular: negative vascular ROS. Anesthesia Plan      general and TIVA     ASA 2       Induction: intravenous. Anesthetic plan and risks discussed with patient. Plan discussed with CRNA.                   CAMI Shelby - CRNA   9/22/2020

## 2020-10-20 ENCOUNTER — OFFICE VISIT (OUTPATIENT)
Dept: GASTROENTEROLOGY | Age: 45
End: 2020-10-20
Payer: COMMERCIAL

## 2020-10-20 VITALS
HEART RATE: 88 BPM | OXYGEN SATURATION: 97 % | WEIGHT: 142.4 LBS | HEIGHT: 64 IN | BODY MASS INDEX: 24.31 KG/M2 | SYSTOLIC BLOOD PRESSURE: 112 MMHG | DIASTOLIC BLOOD PRESSURE: 64 MMHG

## 2020-10-20 PROCEDURE — 99213 OFFICE O/P EST LOW 20 MIN: CPT | Performed by: NURSE PRACTITIONER

## 2020-10-20 ASSESSMENT — ENCOUNTER SYMPTOMS
SHORTNESS OF BREATH: 0
BLOOD IN STOOL: 0
COUGH: 0
DIARRHEA: 0
TROUBLE SWALLOWING: 0
ANAL BLEEDING: 0
CONSTIPATION: 0
ABDOMINAL DISTENTION: 0
RECTAL PAIN: 0
VOMITING: 0
ABDOMINAL PAIN: 0
NAUSEA: 0
CHOKING: 0

## 2020-10-20 NOTE — PROGRESS NOTES
placed in this encounter. Medications  No orders of the defined types were placed in this encounter.         Patient History:     Past Medical History:   Diagnosis Date    C. difficile colitis     Carpal tunnel syndrome on right     Depression     Diverticulitis     Fibromyalgia     Headache(784.0)     History of blood transfusion     Irritable bowel syndrome        Past Surgical History:   Procedure Laterality Date     SECTION      x 2    COLONOSCOPY N/A 2020    Dr Savanah Rhodes, 5 yr recall    MOUTH SURGERY         Family History   Problem Relation Age of Onset    Lung Cancer Mother     Heart Disease Father     Hypertension Brother     Colon Cancer Neg Hx     Colon Polyps Neg Hx     Esophageal Cancer Neg Hx     Liver Cancer Neg Hx     Liver Disease Neg Hx     Rectal Cancer Neg Hx     Stomach Cancer Neg Hx        Social History     Socioeconomic History    Marital status:      Spouse name: None    Number of children: None    Years of education: None    Highest education level: None   Occupational History    None   Social Needs    Financial resource strain: None    Food insecurity     Worry: None     Inability: None    Transportation needs     Medical: None     Non-medical: None   Tobacco Use    Smoking status: Former Smoker     Packs/day: 0.50     Years: 30.00     Pack years: 15.00     Types: Cigarettes     Last attempt to quit: 2018     Years since quittin.7    Smokeless tobacco: Never Used   Substance and Sexual Activity    Alcohol use: No    Drug use: Never    Sexual activity: None   Lifestyle    Physical activity     Days per week: None     Minutes per session: None    Stress: None   Relationships    Social connections     Talks on phone: None     Gets together: None     Attends [employer] service: None     Active member of club or organization: None     Attends meetings of clubs or organizations: None     Relationship status: None    Intimate partner violence     Fear of current or ex partner: None     Emotionally abused: None     Physically abused: None     Forced sexual activity: None   Other Topics Concern    None   Social History Narrative    None       Current Outpatient Medications   Medication Sig Dispense Refill    Multiple Vitamins-Minerals (ONE DAILY MULTIVITAMIN WOMEN PO) Take by mouth      amitriptyline (ELAVIL) 50 MG tablet Take 1 tablet by mouth nightly 90 tablet 3    albuterol sulfate HFA (VENTOLIN HFA) 108 (90 Base) MCG/ACT inhaler Inhale 2 puffs into the lungs every 6 hours as needed for Wheezing 1 Inhaler 3    cyclobenzaprine (FLEXERIL) 10 MG tablet TAKE ONE TABLET BY MOUTH 3 TIMES A DAY AS NEEDED FOR MUSCLES SPASMS 90 tablet 2    gabapentin (NEURONTIN) 400 MG capsule Take 1 capsule by mouth 3 times daily for 30 days. (Patient not taking: Reported on 10/20/2020) 90 capsule 0     No current facility-administered medications for this visit. Allergies   Allergen Reactions    Codeine Nausea And Vomiting     Or synth.  Ms Contin [Morphine Sulfate] Other (See Comments)     Doesn't work per patient        Review of Systems   Constitutional: Negative for activity change, appetite change, fatigue, fever and unexpected weight change. HENT: Negative for trouble swallowing. Respiratory: Negative for cough, choking and shortness of breath. Cardiovascular: Negative for chest pain. Gastrointestinal: Negative for abdominal distention, abdominal pain, anal bleeding, blood in stool, constipation, diarrhea, nausea, rectal pain and vomiting. Allergic/Immunologic: Negative for food allergies. All other systems reviewed and are negative. Objective:     /64   Pulse 88   Ht 5' 4\" (1.626 m)   Wt 142 lb 6.4 oz (64.6 kg)   SpO2 97%   BMI 24.44 kg/m²     Physical Exam  Vitals signs reviewed. Constitutional:       General: She is not in acute distress. Appearance: She is well-developed. HENT:      Nose:      Comments: Mask on     Mouth/Throat:      Comments: Mask on  Eyes:      General:         Right eye: No discharge. Left eye: No discharge. Neck:      Musculoskeletal: Normal range of motion. Cardiovascular:      Rate and Rhythm: Normal rate and regular rhythm. Heart sounds: No murmur. Pulmonary:      Effort: Pulmonary effort is normal. No respiratory distress. Breath sounds: Normal breath sounds. Abdominal:      General: Bowel sounds are normal. There is no distension. Palpations: Abdomen is soft. There is no mass. Tenderness: There is no abdominal tenderness. There is no guarding or rebound. Musculoskeletal: Normal range of motion. Skin:     General: Skin is warm and dry. Neurological:      Mental Status: She is alert and oriented to person, place, and time.    Psychiatric:         Behavior: Behavior normal. Detail Level: Simple

## 2020-10-20 NOTE — PATIENT INSTRUCTIONS
of fluids as you increase your fiber intake. Fruits  Serving size  Total fiber (grams)    Pear  1 medium  5.1    Figs, dried  2 medium  3.7    Blueberries  1 cup  3.5    Apple, with skin  1 medium  4.4    Strawberries  1 cup  3.3    Peaches, dried  3 halves  3.2    Orange  1 medium  3.1    Apricots, dried  10 halves  2.6    Raisins  1.5-ounce box  1.6    Grains, cereal & pasta  Serving size  Total fiber (grams)    Spaghetti, whole-wheat  1 cup  6.3    Bran flakes  3/4 cup  5.1    Oatmeal  1 cup  4.0    Bread, rye  1 slice  1.9    Bread, whole-wheat  1 slice  1.9    Bread, mixed-grain  1 slice  1.7    Bread, cracked-wheat  1 slice  1.4      Cipro 500mg two times daily for 7 days  Flagyl 500mg 3 times daily for 7 days    If no improvement in pain or fever develops please call office to schedule ct of abdomen    Go to ER with any severe abdominal pain or rectal bleeding    Low residue diet until pain resolved. Then gradually increase diet to regular high fiber. Avoid nuts, seeds, popcorn and peels from fruit or vegetables. If labs are done, abnormal results will be called with any recommendations. Low-Fiber Diet: After Your Visit  Your Care Instructions  When your bowels are irritated, you may need to limit fiber in your diet until the problem clears up. Your doctor and dietitian can help you design a low-fiber diet based on your health and what you prefer to eat. Ask your doctor how long you should stay on a low-fiber diet. Your doctor probably will have you start adding more fiber to your diet as you get better. Always talk with your doctor or dietitian before you make changes in your diet. Follow-up care is a key part of your treatment and safety. Be sure to make and go to all appointments, and call your doctor if you are having problems. Its also a good idea to know your test results and keep a list of the medicines you take. How can you care for yourself at home?   · Choose white or refined grains, and avoid whole grains. That means eating white or refined cereals, breads, crackers, rice, or pasta. · Peel the skin from fruits and vegetables before you eat or cook them. Avoid eating skins, seeds, and hulls. · Eat frozen or canned fruit. Low-fiber fruits include applesauce, ripe bananas, and fruit juice without pulp. · Eat low-fiber vegetables, which include well-cooked vegetables and vegetable juice. · Drink or eat milk, yogurt, or other milk products, if you can digest dairy without too many problems. Your doctor may limit milk and milk products for a while. If so, he or she may recommend a calcium and vitamin D supplement. · Eat well-cooked meat, such as chicken, turkey, fish, or lean meat. You also can eat eggs and tofu.   · Avoid these foods:  · Bran, brown or wild rice, oatmeal, granola, corn, sravanthi crackers, barley, and whole wheat and other whole-grain breads, such as rye bread  · Cereals with more than 3 grams of fiber a serving  · Berries, rhubarb, prunes, prune juice, and all dried fruits  · Raw vegetables  · Cabbage, broccoli, brussels sprouts, and cauliflower  · Cooked dried beans, lentils, and split peas  · Crunchy peanut butter  · Ice cream with fruit pieces in it  · Coconut, nuts, popcorn, raisins, and seeds, or any ice cream, yogurt, or cheese with these in them

## 2020-10-26 ENCOUNTER — OFFICE VISIT (OUTPATIENT)
Dept: PRIMARY CARE CLINIC | Age: 45
End: 2020-10-26
Payer: COMMERCIAL

## 2020-10-26 VITALS
BODY MASS INDEX: 24.24 KG/M2 | RESPIRATION RATE: 16 BRPM | SYSTOLIC BLOOD PRESSURE: 108 MMHG | HEIGHT: 64 IN | DIASTOLIC BLOOD PRESSURE: 70 MMHG | WEIGHT: 142 LBS | HEART RATE: 74 BPM | TEMPERATURE: 98.2 F

## 2020-10-26 PROCEDURE — 99214 OFFICE O/P EST MOD 30 MIN: CPT | Performed by: FAMILY MEDICINE

## 2020-10-26 RX ORDER — AMITRIPTYLINE HYDROCHLORIDE 50 MG/1
50 TABLET, FILM COATED ORAL NIGHTLY
Qty: 90 TABLET | Refills: 3 | Status: SHIPPED | OUTPATIENT
Start: 2020-10-26 | End: 2021-12-02 | Stop reason: SDUPTHER

## 2020-10-26 RX ORDER — POLYETHYLENE GLYCOL-3350 AND ELECTROLYTES WITH FLAVOR PACK 240; 5.84; 2.98; 6.72; 22.72 G/278.26G; G/278.26G; G/278.26G; G/278.26G; G/278.26G
POWDER, FOR SOLUTION ORAL
COMMUNITY
Start: 2020-09-04 | End: 2020-10-26

## 2020-10-26 RX ORDER — CYCLOBENZAPRINE HCL 10 MG
TABLET ORAL
Qty: 90 TABLET | Refills: 2 | Status: SHIPPED | OUTPATIENT
Start: 2020-10-26

## 2020-10-26 RX ORDER — ALBUTEROL SULFATE 90 UG/1
2 AEROSOL, METERED RESPIRATORY (INHALATION) EVERY 6 HOURS PRN
Qty: 1 INHALER | Refills: 3 | Status: SHIPPED | OUTPATIENT
Start: 2020-10-26 | End: 2022-02-09

## 2020-10-26 ASSESSMENT — ENCOUNTER SYMPTOMS
NAUSEA: 0
BACK PAIN: 0
DIARRHEA: 0
ABDOMINAL PAIN: 0
COLOR CHANGE: 0
COUGH: 0
EYE DISCHARGE: 0
WHEEZING: 0
VOMITING: 0

## 2020-10-26 NOTE — PROGRESS NOTES
SUBJECTIVE:    Patient ID: Anna Gordillo is a 54 y.o. female. HPI:   Patient presents today for a checkup. She states overall she is doing okay. She does complain today of some right scapular pain. She states this has been going on for several months to even years. She states that she has not had any work-up or anything done for it to this point. She states that it bothers her if she is crocheting or knitting. It also bothers her if she is using it much doing day-to-day things. She states that it throbs at night. She states that there is not really think she can do to make it better. She states that she is still taking her albuterol as needed for her reactive airway. She states that this is working well for her. She denies any side effects to it. She does have a history of fibromyalgia and is on amitriptyline at bedtime. She takes this and Flexeril and states that these are working well for her. She does need refills on these today. Past Medical History:   Diagnosis Date    C. difficile colitis     Carpal tunnel syndrome on right     Depression     Diverticulitis     Fibromyalgia     Headache(784.0)     History of blood transfusion     Irritable bowel syndrome       Current Outpatient Medications   Medication Sig Dispense Refill    amitriptyline (ELAVIL) 50 MG tablet Take 1 tablet by mouth nightly 90 tablet 3    albuterol sulfate HFA (VENTOLIN HFA) 108 (90 Base) MCG/ACT inhaler Inhale 2 puffs into the lungs every 6 hours as needed for Wheezing 1 Inhaler 3    cyclobenzaprine (FLEXERIL) 10 MG tablet TAKE ONE TABLET BY MOUTH 3 TIMES A DAY AS NEEDED FOR MUSCLES SPASMS 90 tablet 2    Multiple Vitamins-Minerals (ONE DAILY MULTIVITAMIN WOMEN PO) Take by mouth       No current facility-administered medications for this visit. Allergies   Allergen Reactions    Codeine Nausea And Vomiting     Or synth.     Ms Contin [Morphine Sulfate] Other (See Comments)     Doesn't work per patient        Review of Systems   Constitutional: Negative for activity change, appetite change and fever. HENT: Negative for congestion and nosebleeds. Eyes: Negative for discharge. Respiratory: Negative for cough and wheezing. Cardiovascular: Negative for chest pain and leg swelling. Gastrointestinal: Negative for abdominal pain, diarrhea, nausea and vomiting. Genitourinary: Negative for difficulty urinating, frequency and urgency. Musculoskeletal: Positive for arthralgias and myalgias. Negative for back pain and gait problem. Skin: Negative for color change and rash. Neurological: Negative for dizziness and headaches. Hematological: Does not bruise/bleed easily. Psychiatric/Behavioral: Negative for sleep disturbance and suicidal ideas. OBJECTIVE:     Physical Exam  Vitals signs reviewed. Constitutional:       General: She is not in acute distress. Appearance: Normal appearance. She is well-developed. She is not diaphoretic. HENT:      Head: Normocephalic and atraumatic. Right Ear: External ear normal.      Left Ear: External ear normal.   Neck:      Musculoskeletal: Normal range of motion and neck supple. Cardiovascular:      Rate and Rhythm: Normal rate and regular rhythm. Pulses: Normal pulses. Heart sounds: Normal heart sounds. No murmur. Pulmonary:      Effort: Pulmonary effort is normal. No respiratory distress. Breath sounds: Normal breath sounds. Skin:     General: Skin is warm and dry. Neurological:      General: No focal deficit present. Mental Status: She is alert and oriented to person, place, and time. Mental status is at baseline. Psychiatric:         Mood and Affect: Mood normal.         Behavior: Behavior normal.         Thought Content:  Thought content normal.         Judgment: Judgment normal.        /70 (Site: Left Upper Arm, Position: Sitting, Cuff Size: Medium Adult)   Pulse 74   Temp 98.2 °F (36.8 °C) (Temporal) Resp 16   Ht 5' 4\" (1.626 m)   Wt 142 lb (64.4 kg)   BMI 24.37 kg/m²      ASSESSMENT:    Lizzie Reyes was seen today for 6 month follow-up. Diagnoses and all orders for this visit:    Pain of right scapula  -     [facility]    Fibromyalgia  -     cyclobenzaprine (FLEXERIL) 10 MG tablet; TAKE ONE TABLET BY MOUTH 3 TIMES A DAY AS NEEDED FOR MUSCLES SPASMS  -     [facility]    Other orders  -     amitriptyline (ELAVIL) 50 MG tablet; Take 1 tablet by mouth nightly  -     albuterol sulfate HFA (VENTOLIN HFA) 108 (90 Base) MCG/ACT inhaler; Inhale 2 puffs into the lungs every 6 hours as needed for Wheezing        PLAN:    Continue current medications. To follow-up with us if symptoms are not improving. I put a referral in for physical therapy. She is to let us know if her symptoms or not getting better in her shoulder blade. If they are not then we will look at getting some imaging studies of this. EMR Dragon/transcription disclaimer:  Much of this encounter note is electronic transcription/translation of spoken language toprinted texts. The electronic translation of spoken language may be erroneous, or at times, nonsensical words or phrases may be inadvertently transcribed.   Although I have reviewed the note for such errors, some may stillexist.

## 2021-01-04 ENCOUNTER — HOSPITAL ENCOUNTER (OUTPATIENT)
Dept: PHYSICAL THERAPY | Age: 46
Setting detail: THERAPIES SERIES
Discharge: HOME | End: 2021-01-04
Payer: COMMERCIAL

## 2021-01-04 PROCEDURE — 97162 PT EVAL MOD COMPLEX 30 MIN: CPT

## 2021-01-04 ASSESSMENT — PAIN DESCRIPTION - ORIENTATION: ORIENTATION: RIGHT

## 2021-01-04 ASSESSMENT — PAIN DESCRIPTION - PAIN TYPE: TYPE: CHRONIC PAIN

## 2021-01-04 ASSESSMENT — PAIN SCALES - GENERAL: PAINLEVEL_OUTOF10: 3

## 2021-01-04 NOTE — PROGRESS NOTES
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 and assisting with care of other kids  Leisure & Hobbies: Crocheting, knitting, walking (to lose weight)    Objective     Observation/Palpation  Palpation: Palpable area of ropey muscle tissue at R rhomboid major area  Observation: Rounded shoulders, mildly slouched posture    AROM RUE (degrees)  RUE AROM : WNL  AROM LUE (degrees)  LUE AROM : WNL    Strength RUE  R Shoulder Flexion: 5/5  R Shoulder Extension: 5/5  R Shoulder ABduction: 5/5  R Shoulder Internal Rotation: 5/5  R Shoulder External Rotation: 5/5  R Elbow Flexion: 5/5  R Elbow Extension: 5/5  R Forearm Pron: 5/5  R Forearm Sup: 5/5  R Wrist Flexion: 4+/5  R Wrist Extension: 5/5  Strength LUE  Comment: 5/5     Additional Measures  Special Tests: (-)Lift off R, (-)Full/empty can R  Other: QuickDASH General: 13.6% impairment  Sensation  Overall Sensation Status: WNL                   Exercises  Exercise 1: STM to R periscapular musculature  Exercise 2: Sidelying R subscap stretch  Exercise 3: Prone Is, Ts, Ys  Exercise 4: Supine on short thoracic pad  Exercise 5: R golfer's stretch  Exercise 6: R upper trap stretch  Exercise 7: R levator stretch  Exercise 8: Chin tucks  Exercise 9: Rhomboid stretch on pole  Exercise 10: R pec stretch on doorway  Exercise 11: Rolling t-ball up wall (thoracic stretch)  Exercise 12: Scapular retraction with t-band  Exercise 13: Standing Is, Ts, Ys  Exercise 14: Lat pulldowns  Exercise 15: Shoulder shrugs/rolls  Exercise 16: Subscapularis stretch with stick (see chart)  Exercise 17: UBE (backwards)                      Assessment   Conditions Requiring Skilled Therapeutic Intervention  Body structures, Functions, Activity limitations: Increased pain;Decreased posture;Decreased high-level IADLs  Assessment: Pt presents with R scapular area pain and demonstrates abnormal muscle tension and postural deviations. Will benefit from skilled PT intervention to address listed impairments. Treatment Diagnosis: R scapular pain  Prognosis: Good  Decision Making: Medium Complexity  REQUIRES PT FOLLOW UP: Yes  Discharge Recommendations: Continue to assess pending progress  Activity Tolerance  Activity Tolerance: Patient Tolerated treatment well         Plan   Plan  Times per week: 2x  Plan weeks: 4-6 weeks  Current Treatment Recommendations: Strengthening, Manual Therapy - Soft Tissue Mobilization, Home Exercise Program, Patient/Caregiver Education & Training, Manual Therapy - Joint Manipulation, Modalities    Goals  Short term goals  Time Frame for Short term goals: 3-4 weeks  Short term goal 1: Independent with HEP  Long term goals  Time Frame for Long term goals : 4-6 weeks  Long term goal 1: Reduce abnormal muscle tension in R periscapular area. Long term goal 2: Report less pain with knitting and crocheting. Long term goal 3: Lessen rounded shoulders and slouched posture. Long term goal 4: Improve QuickDASH score to 9% impairment or less.   Patient Goals   Patient goals : reduce R scapular pain       Therapy Time   Individual Concurrent Group Co-treatment   Time In 1538         Time Out 1615         Minutes 310 [facility] Joao Lei PT

## 2021-01-08 ENCOUNTER — HOSPITAL ENCOUNTER (OUTPATIENT)
Dept: PHYSICAL THERAPY | Age: 46
Setting detail: THERAPIES SERIES
End: 2021-01-08
Payer: COMMERCIAL

## 2021-01-11 ENCOUNTER — HOSPITAL ENCOUNTER (OUTPATIENT)
Dept: PHYSICAL THERAPY | Age: 46
Setting detail: THERAPIES SERIES
Discharge: HOME | End: 2021-01-11
Payer: COMMERCIAL

## 2021-01-11 PROCEDURE — 97110 THERAPEUTIC EXERCISES: CPT

## 2021-01-11 ASSESSMENT — PAIN DESCRIPTION - PAIN TYPE: TYPE: CHRONIC PAIN

## 2021-01-11 ASSESSMENT — PAIN DESCRIPTION - ORIENTATION: ORIENTATION: RIGHT

## 2021-01-11 NOTE — PROGRESS NOTES
Physical Therapy  Daily Treatment Note  Date: 2021  Patient Name: Aracelis Bell  MRN: [id number]     :   1975    Subjective:   General  Additional Pertinent Hx: 53 y/o F presents with R scapular pain. PMH includes fibro, hx CTS R  Referring Practitioner: Nu Shafer MD  PT Insurance Information: [employer] (20 visits per yr before auth)  Total # of Visits Approved: 12  Total # of Visits to Date: 2  Plan of Care/Certification Expiration Date: 21  Progress Note Due Date: 21  Subjective: today it's not bad at all, may be a 1/10 but some days it can get pretty bad depending on what i've been doing  Patient Currently in Pain: Yes  Pain Level: 1  Pain Type: Chronic pain  Pain Location: Shoulder; Other (Comment)(scapula)  Pain Orientation: Right       Treatment Activities:       Exercises  Exercise 1: STM to R periscapular musculature  x 3' (not very involved today)  Exercise 2: Sidelying R subscap stretch  10\" x 5  Exercise 3: Prone Is, Ts, Ys  x 10 ea  Exercise 4: Supine on short thoracic pad  x 2'  Exercise 5: R golfer's stretch  10\" x 5  Exercise 6: R upper trap stretch  10\" x 5  Exercise 7: R levator stretch  10\" x 5  Exercise 8: Chin tucks  3\" x 10  Exercise 9: Rhomboid stretch on pole  10\" x 5  Exercise 10: R pec stretch on doorway  10\" x 5  Exercise 11: Rolling t-ball up wall (thoracic stretch)  5\" x 5  Exercise 12: Scapular retraction with t-band  red x 10  Exercise 13: Standing Is, Ts, Ys  red x 10 ea  Exercise 14: Lat pulldowns  red x 10  Exercise 15: Shoulder shrugs/rolls  2#  x 15 ea  Exercise 16: Subscapularis stretch with stick (see chart)  10\" x 5  Exercise 17: UBE (backwards)  lvl 2  x 3'     Assessment:   Conditions Requiring Skilled Therapeutic Intervention  Body structures, Functions, Activity limitations: Increased pain;Decreased posture;Decreased high-level IADLs  Assessment: Initiated POC per initial evaluation, she had min tenderness with palpation around scapular border so only 3' spent with this today but if experiencing increased pain on future visits this time will need to be increased to address issues in the region. Guided/instructed her thru exercise with verbal and tactile cues and demonstration at times as wel for proper technique with exercises. Will advance routine as appropriate. Treatment Diagnosis: R scapular pain  REQUIRES PT FOLLOW UP: Yes        Goals:  Short term goals  Time Frame for Short term goals: 3-4 weeks  Short term goal 1: Independent with HEP  Long term goals  Time Frame for Long term goals : 4-6 weeks  Long term goal 1: Reduce abnormal muscle tension in R periscapular area. Long term goal 2: Report less pain with knitting and crocheting. Long term goal 3: Lessen rounded shoulders and slouched posture. Long term goal 4: Improve QuickDASH score to 9% impairment or less.   Patient Goals   Patient goals : reduce R scapular pain    Plan:    Times per week: 2x  Plan weeks: 4-6 weeks  Current Treatment Recommendations: Strengthening, Manual Therapy - Soft Tissue Mobilization, Home Exercise Program, Patient/Caregiver Education & Training, Manual Therapy - Joint Manipulation, Modalities        Therapy Time   Individual Concurrent Group Co-treatment   Time In 8629         Time Out 1626         Minutes Nguyen Prado PTA    Electronically signed by Chiara Saldana PTA on 1/11/2021 at 4:40 PM

## 2021-01-15 ENCOUNTER — HOSPITAL ENCOUNTER (OUTPATIENT)
Dept: PHYSICAL THERAPY | Age: 46
Setting detail: THERAPIES SERIES
Discharge: HOME | End: 2021-01-15
Payer: COMMERCIAL

## 2021-01-15 PROCEDURE — 97110 THERAPEUTIC EXERCISES: CPT

## 2021-01-15 ASSESSMENT — PAIN SCALES - GENERAL: PAINLEVEL_OUTOF10: 0

## 2021-01-15 ASSESSMENT — PAIN DESCRIPTION - DESCRIPTORS: DESCRIPTORS: ACHING

## 2021-01-15 ASSESSMENT — PAIN DESCRIPTION - PAIN TYPE: TYPE: CHRONIC PAIN

## 2021-01-18 ENCOUNTER — HOSPITAL ENCOUNTER (OUTPATIENT)
Dept: PHYSICAL THERAPY | Age: 46
Setting detail: THERAPIES SERIES
Discharge: HOME | End: 2021-01-18
Payer: COMMERCIAL

## 2021-01-18 PROCEDURE — 97110 THERAPEUTIC EXERCISES: CPT

## 2021-01-18 ASSESSMENT — PAIN DESCRIPTION - DESCRIPTORS: DESCRIPTORS: ACHING;DULL

## 2021-01-18 NOTE — PROGRESS NOTES
Daily Treatment Note  Date: 2021  Patient Name: Concetta Goetz  MRN: [id number]     :   1975    Subjective:   General  Additional Pertinent Hx: 55 y/o F presents with R scapular pain. PMH includes fibro, hx CTS R  Response To Previous Treatment: Patient with no complaints from previous session. Referring Practitioner: Nella Rogers MD  PT Visit Information  PT Insurance Information: [employer] (20 visits per yr before auth)  Total # of Visits Approved: 12  Total # of Visits to Date: 4  Plan of Care/Certification Expiration Date: 21  Progress Note Due Date: 21  Subjective  Subjective: I had the same amount of pain with or without the bike so we can do it today.   Pain Screening  Patient Currently in Pain: Yes  Pain Assessment  Pain Assessment: 0-10  Pain Level: 1  Pain Type: Chronic pain  Pain Location: Shoulder  Pain Orientation: Right  Pain Descriptors: Aching;Dull  Vital Signs  Patient Currently in Pain: Yes       Treatment Activities:    Exercises  Exercise 1: STM to R periscapular musculature  x 3' (not today)  Exercise 2: L Sidelying R subscap stretch  10\" x 5  (mobs)  Exercise 3: Prone Is, Ts, Ys  x 10 ea  Exercise 4: Supine on short thoracic pad  x 2'  Exercise 5: R golfer's stretch  10\" x 5  Exercise 6: R upper trap stretch  10\" x 5  Exercise 7: R levator stretch  10\" x 5  Exercise 8: Chin tucks  3\" x 10  Exercise 9: Rhomboid stretch on pole  10\" x 5  Exercise 10: R pec stretch on doorway  10\" x 5  Exercise 11: Rolling t-ball up wall (thoracic stretch)  5\" x 5  Exercise 12: Scapular retraction with t-band  red x 10  Exercise 13: Standing Is, Ts, Ys  red x 10 ea  Exercise 14: Lat pulldowns  red x 10  Exercise 15: Shoulder shrugs/rolls  2#  x 15 ea  Exercise 16: Subscapularis stretch with stick (see chart)  10\" x 5  Exercise 17: UBE (backwards)  lvl 2  x 3'   Assessment:   Conditions Requiring Skilled Therapeutic Intervention  Body structures, Functions, Activity limitations: Increased pain;Decreased posture;Decreased high-level IADLs  Assessment: Patient showing good understanding of exericses and only requiring minimal verbal cues for technique. She reports no increased pain with any exerice. She was able to add back UBE today. Instructed her to let us know how she felt after today. Treatment Diagnosis: R scapular pain  REQUIRES PT FOLLOW UP: Yes  Discharge Recommendations: Continue to assess pending progress      Goals:  Short term goals  Time Frame for Short term goals: 3-4 weeks  Short term goal 1: Independent with HEP  Long term goals  Time Frame for Long term goals : 4-6 weeks  Long term goal 1: Reduce abnormal muscle tension in R periscapular area. Long term goal 2: Report less pain with knitting and crocheting. Long term goal 3: Lessen rounded shoulders and slouched posture. Long term goal 4: Improve QuickDASH score to 9% impairment or less.   Patient Goals   Patient goals : reduce R scapular pain    Plan:    Plan  Times per week: 2x  Plan weeks: 4-6 weeks  Current Treatment Recommendations: Strengthening, Manual Therapy - Soft Tissue Mobilization, Home Exercise Program, Patient/Caregiver Education & Training, Manual Therapy - Joint Manipulation, Modalities  Timed Code Treatment Minutes: 41 Minutes     Therapy Time   Individual Concurrent Group Co-treatment   Time In 1503         Time Out 2672         Minutes 41         Timed Code Treatment Minutes: 375 Marleny Babcock PTA    Electronically signed by Marylee Reins, PTA on 1/18/2021 at 4:01 PM

## 2021-01-22 ENCOUNTER — APPOINTMENT (OUTPATIENT)
Dept: PHYSICAL THERAPY | Age: 46
End: 2021-01-22
Payer: COMMERCIAL

## 2021-01-25 ENCOUNTER — HOSPITAL ENCOUNTER (OUTPATIENT)
Dept: PHYSICAL THERAPY | Age: 46
Setting detail: THERAPIES SERIES
Discharge: HOME | End: 2021-01-25
Payer: COMMERCIAL

## 2021-01-25 PROCEDURE — 97110 THERAPEUTIC EXERCISES: CPT

## 2021-01-25 PROCEDURE — 97140 MANUAL THERAPY 1/> REGIONS: CPT

## 2021-01-25 NOTE — PROGRESS NOTES
Daily Treatment Note  Date: 2021  Patient Name: Prosper Steven  MRN: [id number]     :   1975    Subjective:   General  Additional Pertinent Hx: 55 y/o F presents with R scapular pain. PMH includes fibro, hx CTS R  Response To Previous Treatment: Patient with no complaints from previous session. Referring Practitioner: Wilmer Joy MD  PT Visit Information  PT Insurance Information: [employer] (20 visits per yr before auth)  Total # of Visits Approved: 12  Total # of Visits to Date: 5  Plan of Care/Certification Expiration Date: 21  Progress Note Due Date: 21  Subjective  Subjective: Patient states she has more pain today and that it is about 3/10. She says she had dental work and isn't sure if that aggitated it.   Pain Screening  Patient Currently in Pain: Yes(3/10 pre tx, 2/10 post tx)  Vital Signs  Patient Currently in Pain: Yes(3/10 pre tx, 2/10 post tx)       Treatment Activities:                                      Exercises  Exercise 1: STM to R periscapular musculature  x 5 min, thoracic AP mobs throughout x 1 min grade 4  Exercise 2: L Sidelying R subscap stretch  10\" x 5  (mobs)  Exercise 3: Prone Is, Ts, Ys  x 10 ea  Exercise 4: Supine on short thoracic pad  x 2'  Exercise 5: R golfer's stretch  10\" x 5  Exercise 6: R upper trap stretch  10\" x 5  Exercise 7: R levator stretch  10\" x 5  Exercise 8: Chin tucks  3\" x 10  Exercise 9: Rhomboid stretch on pole  10\" x 5  Exercise 10: R pec stretch on doorway  10\" x 5  Exercise 11: Rolling t-ball up wall (thoracic stretch)  5\" x 5  Exercise 12: Scapular retraction with t-band  red x 10  Exercise 13: Standing Is, Ts, Ys  red x 10 ea  Exercise 14: Lat pulldowns  red x 10  Exercise 15: Shoulder shrugs/rolls  2#  x 15 ea  Exercise 16: Subscapularis stretch with stick (see chart)  10\" x 5  Exercise 17: UBE (backwards)  lvl 2  x 3'                                   Assessment:   Conditions Requiring Skilled Therapeutic Intervention  Body structures, Functions, Activity limitations: Increased pain;Decreased posture;Decreased high-level IADLs  Assessment: Patient did well with tx today with min to mod v.c. for proper tech with ex's today. She did have decreased mobility of thoracic spine noted, but improved with manual therapy. She tolerated ex's well and required no rest breaks throughout and reported slight decreased pain. Treatment Diagnosis: R scapular pain  REQUIRES PT FOLLOW UP: Yes  Discharge Recommendations: Continue to assess pending progress                                                         Goals:  Short term goals  Time Frame for Short term goals: 3-4 weeks  Short term goal 1: Independent with HEP  Long term goals  Time Frame for Long term goals : 4-6 weeks  Long term goal 1: Reduce abnormal muscle tension in R periscapular area. Long term goal 2: Report less pain with knitting and crocheting. Long term goal 3: Lessen rounded shoulders and slouched posture. Long term goal 4: Improve QuickDASH score to 9% impairment or less.   Patient Goals   Patient goals : reduce R scapular pain    Plan:    Plan  Times per week: 2x  Plan weeks: 4-6 weeks  Current Treatment Recommendations: Strengthening, Manual Therapy - Soft Tissue Mobilization, Home Exercise Program, Patient/Caregiver Education & Training, Manual Therapy - Joint Manipulation, Modalities  Timed Code Treatment Minutes: 38 Minutes     Therapy Time   Individual Concurrent Group Co-treatment   Time In 1500         Time Out 1538         Minutes 38         Timed Code Treatment Minutes: 1500 E Abdirahman Babcock PT    Electronically signed by Vanessa Chavarria PT on 1/25/2021 at 3:47 PM

## 2021-01-29 ENCOUNTER — HOSPITAL ENCOUNTER (OUTPATIENT)
Dept: PHYSICAL THERAPY | Age: 46
Setting detail: THERAPIES SERIES
Discharge: HOME | End: 2021-01-29
Payer: COMMERCIAL

## 2021-01-29 PROCEDURE — 97140 MANUAL THERAPY 1/> REGIONS: CPT

## 2021-01-29 PROCEDURE — 97110 THERAPEUTIC EXERCISES: CPT

## 2021-01-29 ASSESSMENT — PAIN DESCRIPTION - LOCATION: LOCATION: SHOULDER

## 2021-01-29 NOTE — PROGRESS NOTES
lvl 2  x 3'                                   Assessment:   Conditions Requiring Skilled Therapeutic Intervention  Body structures, Functions, Activity limitations: Increased pain;Decreased posture;Decreased high-level IADLs  Assessment: Two larger trigger point bands noted today in R infraspinatus muscle with pain locally. Improved thoracic joint motion today however areas around C-T junction still restricted. Patient able to increase repetitions with some exercises today with no complaint. Issued Biofreeze samples and encouraged patient to stretch out periscapular musculature with golfer's stretch when using this. Treatment Diagnosis: R scapular pain  REQUIRES PT FOLLOW UP: Yes  Discharge Recommendations: Continue to assess pending progress        Goals:  Short term goals  Time Frame for Short term goals: 3-4 weeks  Short term goal 1: Independent with HEP  Long term goals  Time Frame for Long term goals : 4-6 weeks  Long term goal 1: Reduce abnormal muscle tension in R periscapular area. Long term goal 2: Report less pain with knitting and crocheting. Long term goal 3: Lessen rounded shoulders and slouched posture. Long term goal 4: Improve QuickDASH score to 9% impairment or less.   Patient Goals   Patient goals : reduce R scapular pain    Plan:    Plan  Times per week: 2x  Plan weeks: 4-6 weeks  Current Treatment Recommendations: Strengthening, Manual Therapy - Soft Tissue Mobilization, Home Exercise Program, Patient/Caregiver Education & Training, Manual Therapy - Joint Manipulation, Modalities  Timed Code Treatment Minutes: 49 Minutes     Therapy Time   Individual Concurrent Group Co-treatment   Time In 1500         Time Out 1549         Minutes 49         Timed Code Treatment Minutes: [address] [facility] BRISA Root   Electronically signed by Meaghan Robles PT on 1/29/2021 at 3:51 PM

## 2021-02-01 ENCOUNTER — HOSPITAL ENCOUNTER (OUTPATIENT)
Dept: PHYSICAL THERAPY | Age: 46
Setting detail: THERAPIES SERIES
Discharge: HOME | End: 2021-02-01
Payer: COMMERCIAL

## 2021-02-01 PROCEDURE — 97110 THERAPEUTIC EXERCISES: CPT

## 2021-02-01 PROCEDURE — 97140 MANUAL THERAPY 1/> REGIONS: CPT

## 2021-02-01 NOTE — PROGRESS NOTES
Daily Treatment Note  Date: 2021  Patient Name: Camelia Thomas  MRN: [id number]     :   1975    Subjective:   General  Additional Pertinent Hx: 53 y/o F presents with R scapular pain. PMH includes fibro, hx CTS R  Response To Previous Treatment: Patient with no complaints from previous session. Referring Practitioner: Dana Buchanan MD  PT Visit Information  PT Insurance Information: [employer] (20 visits per yr before auth)  Total # of Visits Approved: 12  Total # of Visits to Date: 7  Plan of Care/Certification Expiration Date: 21  Progress Note Due Date: 21  Subjective  Subjective: Patient states she is doing ok today with just a little pain at about 2/10.   Pain Screening  Patient Currently in Pain: Yes(2/10 pre tx, 0/10 post tx)  Vital Signs  Patient Currently in Pain: Yes(2/10 pre tx, 0/10 post tx)       Treatment Activities:                                      Exercises  Exercise 1: STM to R periscapular musculature  x 5 min, thoracic AP mobs throughout x 2 min grade 4 with audible pop at T3  Exercise 2: L Sidelying R subscap stretch  10\" x 5  (mobs)  Exercise 3: Prone Is, Ts, Ys  x 10 ea  Exercise 4: Supine on short thoracic pad  x 3'  Exercise 5: R golfer's stretch  10\" x 5  Exercise 6: R upper trap stretch  10\" x 5  Exercise 7: R levator stretch  10\" x 5  Exercise 8: Chin tucks  3\" x 10  Exercise 9: Rhomboid stretch on pole  10\" x 5  Exercise 10: R pec stretch on doorway  10\" x 5  Exercise 11: Rolling t-ball up wall (thoracic stretch)  5\" x 8  Exercise 12: Scapular retraction with t-band  red x 15  Exercise 13: Standing Is, Ts, Ys  red x 10 ea  Exercise 14: Lat pulldowns  red x 10  Exercise 15: Shoulder shrugs/rolls  2#  x 15 ea  Exercise 16: Subscapularis stretch with stick (see chart)  10\" x 5  Exercise 17: UBE (backwards)  lvl 2  x 5'                                   Assessment:   Conditions Requiring Skilled Therapeutic Intervention  Body structures, Functions, Activity limitations: Increased pain;Decreased posture;Decreased high-level IADLs  Assessment: Patient did well with tx today with min to mod v.c. for proper tech for ex's. She did have audible pop with ap mobs at T3 today and tolerated all manual therapy well with decreased muscular tension noted after. She reported no pain after session today. Treatment Diagnosis: R scapular pain  REQUIRES PT FOLLOW UP: Yes  Discharge Recommendations: Continue to assess pending progress                                                    Goals:  Short term goals  Time Frame for Short term goals: 3-4 weeks  Short term goal 1: Independent with HEP  Long term goals  Time Frame for Long term goals : 4-6 weeks  Long term goal 1: Reduce abnormal muscle tension in R periscapular area. Long term goal 2: Report less pain with knitting and crocheting. Long term goal 3: Lessen rounded shoulders and slouched posture. Long term goal 4: Improve QuickDASH score to 9% impairment or less.   Patient Goals   Patient goals : reduce R scapular pain    Plan:    Plan  Times per week: 2x  Plan weeks: 4-6 weeks  Current Treatment Recommendations: Strengthening, Manual Therapy - Soft Tissue Mobilization, Home Exercise Program, Patient/Caregiver Education & Training, Manual Therapy - Joint Manipulation, Modalities  Timed Code Treatment Minutes: 42 Minutes     Therapy Time   Individual Concurrent Group Co-treatment   Time In 1500         Time Out 1542         Minutes 42         Timed Code Treatment Minutes: [address]     Electronically signed by Maximus Ramos PT on 2/1/2021 at 3:55 PM

## 2021-02-05 ENCOUNTER — HOSPITAL ENCOUNTER (OUTPATIENT)
Dept: PHYSICAL THERAPY | Age: 46
Setting detail: THERAPIES SERIES
End: 2021-02-05
Payer: COMMERCIAL

## 2021-04-05 NOTE — PROGRESS NOTES
[facility] Outpatient Physical Therapy  Discharge Summary        Date:4/5/2021    Patient Denny Stallings    YQE:[id number]    PAS:[birth year]    Diagnosis:Diagnosis: R scapular pain       Total # of Visits to Date: 7       Assessment: Patient did well with tx today with min to mod v.c. for proper tech for ex's. She did have audible pop with ap mobs at T3 today and tolerated all manual therapy well with decreased muscular tension noted after. She reported no pain after session today. D/C ASSESSMENT: Pt attended 7 PT visits, last on 2/1/01. She has not returned for further visits and will be d/c from services at this time. Unable to assess goals as pt did not return for further visits, though she did report no pain at end of her last visit. GOALS:    Short term goals  Time Frame for Short term goals: 3-4 weeks  Short term goal 1: Independent with HEP      Long term goals  Time Frame for Long term goals : 4-6 weeks  Long term goal 1: Reduce abnormal muscle tension in R periscapular area. Long term goal 2: Report less pain with knitting and crocheting. Long term goal 3: Lessen rounded shoulders and slouched posture. Long term goal 4: Improve QuickDASH score to 9% impairment or less.       PLAN:  D/c from services      Electronically signed by Chetna Cruz PT on 4/5/2021 at 1:14 PM

## 2021-04-26 ENCOUNTER — TRANSCRIBE ORDERS (OUTPATIENT)
Dept: ADMINISTRATIVE | Facility: HOSPITAL | Age: 46
End: 2021-04-26

## 2021-04-26 ENCOUNTER — OFFICE VISIT (OUTPATIENT)
Dept: PRIMARY CARE CLINIC | Age: 46
End: 2021-04-26
Payer: COMMERCIAL

## 2021-04-26 VITALS
SYSTOLIC BLOOD PRESSURE: 118 MMHG | HEART RATE: 79 BPM | BODY MASS INDEX: 26.63 KG/M2 | OXYGEN SATURATION: 100 % | DIASTOLIC BLOOD PRESSURE: 88 MMHG | WEIGHT: 156 LBS | TEMPERATURE: 97.2 F | RESPIRATION RATE: 16 BRPM | HEIGHT: 64 IN

## 2021-04-26 DIAGNOSIS — R53.83 FATIGUE, UNSPECIFIED TYPE: ICD-10-CM

## 2021-04-26 DIAGNOSIS — M79.672 BILATERAL FOOT PAIN: ICD-10-CM

## 2021-04-26 DIAGNOSIS — Z12.31 SCREENING MAMMOGRAM FOR HIGH-RISK PATIENT: Primary | ICD-10-CM

## 2021-04-26 DIAGNOSIS — Z12.31 VISIT FOR SCREENING MAMMOGRAM: Primary | ICD-10-CM

## 2021-04-26 DIAGNOSIS — M79.7 FIBROMYALGIA: ICD-10-CM

## 2021-04-26 DIAGNOSIS — M79.671 BILATERAL FOOT PAIN: ICD-10-CM

## 2021-04-26 PROCEDURE — 99213 OFFICE O/P EST LOW 20 MIN: CPT | Performed by: FAMILY MEDICINE

## 2021-04-26 ASSESSMENT — ENCOUNTER SYMPTOMS
DIARRHEA: 0
COLOR CHANGE: 0
BACK PAIN: 0
VOMITING: 0
NAUSEA: 0
WHEEZING: 0
EYE DISCHARGE: 0
COUGH: 0
ABDOMINAL PAIN: 0

## 2021-04-26 NOTE — PROGRESS NOTES
SUBJECTIVE:    Patient ID: Georgie David is a 54 y.o. female. HPI:   Patient presents today for 6-month follow-up. She has a history of fibromyalgia. States that her amitriptyline is helping significantly with her symptoms. She states she would like to continue the current dose. She denies any side effects for it. She states she is having trouble with bilateral foot pain. She states that her feet have are flat and she has flat arches. She has tried different shoes as well as socks. She states that she is having pain in both feet and it does radiate up into her calves. She states that then hurts her lower back especially she has been up on her feet for a long period of time. She states that she is not having any redness or swelling of her feet but they do hurt. These been hurting for several years. She has not tried any specific exercises for them. She states that she has not tried doing anything as far as seeing a specialist for them yet. Past Medical History:   Diagnosis Date    C. difficile colitis     Carpal tunnel syndrome on right     Depression     Diverticulitis     Fibromyalgia     Headache(784.0)     History of blood transfusion     Irritable bowel syndrome       Current Outpatient Medications   Medication Sig Dispense Refill    amitriptyline (ELAVIL) 50 MG tablet Take 1 tablet by mouth nightly 90 tablet 3    albuterol sulfate HFA (VENTOLIN HFA) 108 (90 Base) MCG/ACT inhaler Inhale 2 puffs into the lungs every 6 hours as needed for Wheezing 1 Inhaler 3    cyclobenzaprine (FLEXERIL) 10 MG tablet TAKE ONE TABLET BY MOUTH 3 TIMES A DAY AS NEEDED FOR MUSCLES SPASMS 90 tablet 2    Multiple Vitamins-Minerals (ONE DAILY MULTIVITAMIN WOMEN PO) Take by mouth       No current facility-administered medications for this visit. Allergies   Allergen Reactions    Codeine Nausea And Vomiting     Or synth.     Ms Contin [Morphine Sulfate] Other (See Comments)     Doesn't work per patient        Review of Systems   Constitutional: Negative for activity change, appetite change and fever. HENT: Negative for congestion and nosebleeds. Eyes: Negative for discharge. Respiratory: Negative for cough and wheezing. Cardiovascular: Negative for chest pain and leg swelling. Gastrointestinal: Negative for abdominal pain, diarrhea, nausea and vomiting. Genitourinary: Negative for difficulty urinating, frequency and urgency. Musculoskeletal: Negative for back pain and gait problem. Skin: Negative for color change and rash. Neurological: Negative for dizziness and headaches. Hematological: Does not bruise/bleed easily. Psychiatric/Behavioral: Negative for sleep disturbance and suicidal ideas. OBJECTIVE:     Physical Exam  Vitals signs reviewed. Constitutional:       General: She is not in acute distress. Appearance: Normal appearance. She is well-developed. She is not diaphoretic. HENT:      Head: Normocephalic and atraumatic. Right Ear: External ear normal.      Left Ear: External ear normal.   Neck:      Musculoskeletal: Normal range of motion and neck supple. Cardiovascular:      Rate and Rhythm: Normal rate and regular rhythm. Pulses: Normal pulses. Heart sounds: Normal heart sounds. No murmur. Pulmonary:      Effort: Pulmonary effort is normal. No respiratory distress. Breath sounds: Normal breath sounds. Musculoskeletal: Normal range of motion. Comments: Bilateral flat arches   Skin:     General: Skin is warm and dry. Neurological:      General: No focal deficit present. Mental Status: She is alert and oriented to person, place, and time. Mental status is at baseline. Psychiatric:         Mood and Affect: Mood normal.         Behavior: Behavior normal.         Thought Content:  Thought content normal.         Judgment: Judgment normal.        /88 (Site: Right Upper Arm, Position: Sitting, Cuff Size: Medium Adult) Pulse 79   Temp 97.2 °F (36.2 °C) (Temporal)   Resp 16   Ht 5' 4\" (1.626 m)   Wt 156 lb (70.8 kg)   SpO2 100%   BMI 26.78 kg/m²      ASSESSMENT:    Jhonny Robertson was seen today for 6 month follow-up and foot pain. Diagnoses and all orders for this visit:    Visit for screening mammogram  -     [facility] DIGITAL SCREEN W OR WO CAD BILATERAL; Future    Fatigue, unspecified type    Fibromyalgia    Bilateral foot pain  -     External Referral To Podiatry        PLAN:    I put a referral in for podiatry. I also given her exercises to do at home for plantar fasciitis. Screening mammogram was ordered today. We are going to do labs physical and Pap at her next visit. Follow-up with us in 6 months for a physical unless needed sooner. EMR Dragon/transcription disclaimer:  Much of this encounter note is electronic transcription/translation of spoken language toprinted texts. The electronic translation of spoken language may be erroneous, or at times, nonsensical words or phrases may be inadvertently transcribed.   Although I have reviewed the note for such errors, some may stillexist.

## 2021-04-26 NOTE — PATIENT INSTRUCTIONS
can also help reduce swelling. But because heat alone may make pain and swelling worse, end a contrast bath with a soak in cold water. · Wear a night splint if your doctor suggests it. A night splint holds your foot with the toes pointed up and the foot and ankle at a 90-degree angle. This position gives the bottom of your foot a constant, gentle stretch. · Do simple exercises such as calf stretches and towel stretches 2 to 3 times each day, especially when you first get up in the morning. These can help the plantar fascia become more flexible. They also make the muscles that support your arch stronger. Hold these stretches for 15 to 30 seconds per stretch. Repeat 2 to 4 times. ? Stand about 1 foot from a wall. Place the palms of both hands against the wall at chest level. Lean forward against the wall, keeping one leg with the knee straight and heel on the ground while bending the knee of the other leg.  ? Sit down on the floor or a mat with your feet stretched in front of you. Roll up a towel lengthwise, and loop it over the ball of your foot. Holding the towel at both ends, gently pull the towel toward you to stretch your foot. · Wear shoes with good arch support. Athletic shoes or shoes with a well-cushioned sole are good choices. · Replace athletic shoes regularly. · Try heel cups or shoe inserts (orthotics) to help cushion your heel. You can buy these at many shoe stores. · Put on your shoes as soon as you get out of bed. Going barefoot or wearing slippers may make your pain worse. · Reach and stay at a good weight for your height. This puts less strain on your feet. When should you call for help? Call your doctor now or seek immediate medical care if:    · You have heel pain with fever, redness, or warmth in your heel.     · You cannot put weight on the sore foot.    Watch closely for changes in your health, and be sure to contact your doctor if:    · You have numbness or tingling in your heel.     · Your heel pain lasts more than 2 weeks. Where can you learn more? Go to https://chpepiceweb.[employer]. org and sign in to your [employer]) account. Enter V743 in the SpeakPhone box to learn more about \"Plantar Fasciitis: Care Instructions. \"     If you do not have an account, please click on the \"Sign Up Now\" link. Current as of: November 16, 2020               Content Version: 12.8  © 2006-2021 Healthwise, Incorporated. Care instructions adapted under license by [facility] ([facility]). If you have questions about a medical condition or this instruction, always ask your healthcare professional. [address] any warranty or liability for your use of this information.

## 2021-04-29 ENCOUNTER — HOSPITAL ENCOUNTER (OUTPATIENT)
Dept: MAMMOGRAPHY | Facility: HOSPITAL | Age: 46
Discharge: HOME | End: 2021-04-29
Admitting: FAMILY MEDICINE

## 2021-04-29 ENCOUNTER — TELEPHONE (OUTPATIENT)
Dept: VASCULAR SURGERY | Facility: CLINIC | Age: 46
End: 2021-04-29

## 2021-04-29 DIAGNOSIS — Z12.31 VISIT FOR SCREENING MAMMOGRAM: ICD-10-CM

## 2021-04-29 PROCEDURE — 77063 BREAST TOMOSYNTHESIS BI: CPT

## 2021-04-29 PROCEDURE — 77067 SCR MAMMO BI INCL CAD: CPT

## 2021-04-29 NOTE — PROGRESS NOTES
[facility] - PODIATRY    Today's Date: 21    Patient Name: Yesica Ocasio  MRN: [id number]  CSN: [id number]  PCP: Fozia Marcum MD  Referring Provider: Fozia Marcum*    SUBJECTIVE     Chief Complaint   Patient presents with   • Establish Care     pt is here for bilateral foot pain - pt stated that no trauma and or injury to either feet - pt expresed that if she is walking around her feet are in extreme pain, but when not moving and resting her feet have zero pain - pt c/o adult aching starting in the middle of bilateral feet and will travel throughout the foot up to the lower back - pt pain level is 5/10 - pcp 21      HPI: Yesica Ocasio, a 45 y.o.female, comes to clinic as a(n) new patient complaining of foot pain. Patient has h/o Fibromyalgia, tobacco abuse, vitamin d def. Patient presents with compalints of chronic foot pain bilaterally on the plantar aspect of the feet. Notes that she previously worked as a  and continues to walk several miles daily recreationally and has had pain for several years. Notes that pain is bilateral in the arch of the foot and radiates out and up the legs and feels like it radiates into the back as well. Pain is worse when walking/standing for long periods and after periods of rest. Notes that she has tried many different shoes and inserts but none have been effective for pain. Denies any known injury or trauma. Denies known issues with back. Admits pain at 5/10 level and described as shooting, aching and nagging. Relates previous treatment(s) including inserts, stretching, rest. Denies any constitutional symptoms. No other pedal complaints at this time.    History reviewed. No pertinent past medical history.  Past Surgical History:   Procedure Laterality Date   •  SECTION     • DENTAL PROCEDURE       Family History   Problem Relation Age of Onset   • Breast cancer Neg Hx      Social History     Socioeconomic  "History   • Marital status: Single     Spouse name: Not on file   • Number of children: Not on file   • Years of education: Not on file   • Highest education level: Not on file   Tobacco Use   • Smoking status: Current Every Day Smoker     Types: Electronic Cigarette   • Smokeless tobacco: Never Used   Vaping Use   • Vaping Use: Every day   • Substances: Nicotine, Flavoring   • Devices: Pre-filled or refillable cartridge   Substance and Sexual Activity   • Alcohol use: Not Currently   • Drug use: Never   • Sexual activity: Defer     Allergies   Allergen Reactions   • Other GI Intolerance     Pt states pain medications (norco, morphine, etc) make her \"violently vomit.\"   • Codeine Nausea And Vomiting     Or synth.   • Morphine Other (See Comments)     Doesn't work per patient      Current Outpatient Medications   Medication Sig Dispense Refill   • amitriptyline (ELAVIL) 25 MG tablet Take 25 mg by mouth Every Night.       No current facility-administered medications for this visit.     Review of Systems   Constitutional: Negative for chills and fever.   HENT: Negative for congestion.    Respiratory: Negative for shortness of breath.    Cardiovascular: Negative for chest pain and leg swelling.   Gastrointestinal: Negative for constipation, diarrhea, nausea and vomiting.   Musculoskeletal: Positive for arthralgias (foot pain). Negative for myalgias.   Skin: Negative for wound.   Neurological: Negative for numbness.       OBJECTIVE     Vitals:    21 1502   BP: 110/72   Pulse: 89   SpO2: 100%       PHYSICAL EXAM  GEN:   Accompanied by none.     Foot/Ankle Exam:       General:   Appearance: appears stated age and healthy    Orientation: AAOx3    Affect: appropriate    Gait: unimpaired    Assistance: independent    Shoe Gear:  Casual shoes    VASCULAR      Right Foot Vascularity   Dorsalis pedis:  2+  Posterior tibial:  2+  Skin Temperature: warm    Edema Gradin+ and pitting  CFT:  3  Pedal Hair Growth:  " Present  Varicosities: none       Left Foot Vascularity   Dorsalis pedis:  2+  Posterior tibial:  2+  Skin Temperature: warm    Edema Gradin+ and pitting  CFT:  3  Pedal Hair Growth:  Present  Varicosities: none        NEUROLOGIC     Right Foot Neurologic   Normal sensation    Light touch sensation:  Normal  Vibratory sensation:  Normal  Hot/Cold sensation: normal    Protective Sensation using [place]-Jered Monofilament:  10     Left Foot Neurologic   Normal sensation    Light touch sensation:  Normal  Vibratory sensation:  Normal  Hot/cold sensation: normal    Protective Sensation using [place]-Jered Monofilament:  10     MUSCULOSKELETAL      Right Foot Musculoskeletal   Ecchymosis:  None  Tenderness: plantar fascia and arch    Arch:  Pes planus (mild)  Hallux valgus: No    Hallux limitus: No    Tailor's bunion: No       Left Foot Musculoskeletal   Ecchymosis:  None  Tenderness: plantar fascia and arch    Arch:  Pes planus (mild)  Hallux valgus: No    Hallux limitus: No    Tailor's bunion: No       MUSCLE STRENGTH     Right Foot Muscle Strength   Foot dorsiflexion:  5  Foot plantar flexion:  5  Foot inversion:  5  Foot eversion:  5     Left Foot Muscle Strength   Foot dorsiflexion:  5  Foot plantar flexion:  5  Foot inversion:  5  Foot eversion:  5     RANGE OF MOTION      Right Foot Range of Motion   Foot and ankle ROM within normal limits    Ankle dorsiflexion:  0     Left Foot Range of Motion   Foot and ankle ROM within normal limits    Ankle dorsiflexion:  0     DERMATOLOGIC     Right Foot Dermatologic   Skin: skin intact    Nails: normal       Left Foot Dermatologic   Skin: skin intact    Nails: normal        Right Foot Additional Comments Tight medial band of plantar fascia      Left Foot Additional Comments: Tight medial band of plantar fascia      RADIOLOGY/NUCLEAR:  Mammo Screening Digital Tomosynthesis Bilateral With CAD    Result Date: 2021  Narrative: EXAMINATION:   MAMMO SCREENING  DIGITAL TOMOSYNTHESIS BILATERAL W CAD- 4/29/2021 12:23 PM CDT  HISTORY: SCREENING BILATERAL DIGITAL MAMMOGRAM WITH TOMOSYNTHESIS 4/29/2021 11:45 AM CDT  CLINICAL HISTORY: Screening.   COMPARISON STUDIES: None.  FINDINGS: Digital CC and MLO views of bilateral breasts were obtained. Tomosynthesis in the MLO and CC projections was also performed.  There are scattered fibroglandular densities (approximately 25-50% glandular tissue). No suspicious masses or calcifications are identified.  There is no architectural distortion. This study was interpreted with CAD.  IMPRESSION AND RECOMMENDATION: No mammographic evidence of malignancy. Recommendation is for the patient to return for routine mammography in one year or sooner, if clinically indicated.  Assessment: BI-RADS Category 2 benign  The patient's information has been added to a reminder system with a target due date for the next mammogram.  COMMENTS: 1. A negative x-ray report should not delay biopsy if a dominant or clinically suspicious mass is present. Four to eight percent of cancers are not identified by x-ray. 2. A negative report reinforces clinical impression. 3. Adenosis and dense breasts may obscure an underlying neoplasm. 4. False positive reports average 8% nationally. 5. The mammograms were evaluated using computer-aided detection. This report was finalized on 04/29/2021 12:25 by Dr. Daniele Valerio MD.      LABORATORY/CULTURE RESULTS:      PATHOLOGY RESULTS:       ASSESSMENT/PLAN     Diagnoses and all orders for this visit:    1. Plantar fasciitis, bilateral (Primary)    2. Foot pain, bilateral    3. Pes planus of both feet      Comprehensive lower extremity examination and evaluation was performed.  Discussed findings and treatment plan including risks, benefits, and treatment options with patient in detail. Patient agreed with treatment plan.  Conservative therapy including daily stretching (demonstrated proper way of performing), icing 3x daily,  decreased activity, and nsaids PRN.   Discussed treatment for PF including injection therapy, oral steroids, stretching, icing, PT, shoes/inserts, and possible surgical correction. Defers injection today. Wants to try custom orthotics.   RX 1 pair CMO  Educated on proper stretching activities.    An After Visit Summary was printed and given to the patient at discharge, including (if requested) any available informative/educational handouts regarding diagnosis, treatment, or medications. All questions were answered to patient/family satisfaction. Should symptoms fail to improve or worsen they agree to call or return to clinic or to go to the Emergency Department. Discussed the importance of following up with any needed screening tests/labs/specialist appointments and any requested follow-up recommended by me today. Importance of maintaining follow-up discussed and patient accepts that missed appointments can delay diagnosis and potentially lead to worsening of conditions.  Return in about 2 months (around 6/30/2021)., or sooner if acute issues arise.        This document has been electronically signed by ROSSI Dubois on April 30, 2021 15:36 CDT

## 2021-04-30 ENCOUNTER — OFFICE VISIT (OUTPATIENT)
Dept: PODIATRY | Facility: CLINIC | Age: 46
End: 2021-04-30

## 2021-04-30 VITALS
DIASTOLIC BLOOD PRESSURE: 72 MMHG | BODY MASS INDEX: 27.11 KG/M2 | OXYGEN SATURATION: 100 % | HEIGHT: 64 IN | SYSTOLIC BLOOD PRESSURE: 110 MMHG | WEIGHT: 158.8 LBS | HEART RATE: 89 BPM

## 2021-04-30 DIAGNOSIS — M72.2 PLANTAR FASCIITIS, BILATERAL: Primary | ICD-10-CM

## 2021-04-30 DIAGNOSIS — M79.671 FOOT PAIN, BILATERAL: ICD-10-CM

## 2021-04-30 DIAGNOSIS — M21.41 PES PLANUS OF BOTH FEET: ICD-10-CM

## 2021-04-30 DIAGNOSIS — M79.672 FOOT PAIN, BILATERAL: ICD-10-CM

## 2021-04-30 DIAGNOSIS — M21.42 PES PLANUS OF BOTH FEET: ICD-10-CM

## 2021-04-30 PROCEDURE — 99203 OFFICE O/P NEW LOW 30 MIN: CPT | Performed by: NURSE PRACTITIONER

## 2021-04-30 RX ORDER — AMITRIPTYLINE HYDROCHLORIDE 50 MG/1
TABLET, FILM COATED ORAL
COMMUNITY
Start: 2021-03-17 | End: 2021-04-30

## 2021-04-30 NOTE — PATIENT INSTRUCTIONS
Plantar Fasciitis Rehab  Ask your health care provider which exercises are safe for you. Do exercises exactly as told by your health care provider and adjust them as directed. It is normal to feel mild stretching, pulling, tightness, or discomfort as you do these exercises. Stop right away if you feel sudden pain or your pain gets worse. Do not begin these exercises until told by your health care provider.  Stretching and range-of-motion exercises  These exercises warm up your muscles and joints and improve the movement and flexibility of your foot. These exercises also help to relieve pain.  Plantar fascia stretch    1. Sit with your left / right leg crossed over your opposite knee.  2. Hold your heel with one hand with that thumb near your arch. With your other hand, hold your toes and gently pull them back toward the top of your foot. You should feel a stretch on the bottom of your toes or your foot (plantar fascia) or both.  3. Hold this stretch for__________ seconds.  4. Slowly release your toes and return to the starting position.  Repeat __________ times. Complete this exercise __________ times a day.  Gastrocnemius stretch, standing  This exercise is also called a calf (gastroc) stretch. It stretches the muscles in the back of the upper calf.  1. Stand with your hands against a wall.  2. Extend your left / right leg behind you, and bend your front knee slightly.  3. Keeping your heels on the floor and your back knee straight, shift your weight toward the wall. Do not arch your back. You should feel a gentle stretch in your upper left / right calf.  4. Hold this position for __________ seconds.  Repeat __________ times. Complete this exercise __________ times a day.  Soleus stretch, standing  This exercise is also called a calf (soleus) stretch. It stretches the muscles in the back of the lower calf.  1. Stand with your hands against a wall.  2. Extend your left / right leg behind you, and bend your front  knee slightly.  3. Keeping your heels on the floor, bend your back knee and shift your weight slightly over your back leg. You should feel a gentle stretch deep in your lower calf.  4. Hold this position for __________ seconds.  Repeat __________ times. Complete this exercise __________ times a day.  Gastroc and soleus stretch, standing step  This exercise stretches the muscles in the back of the lower leg. These muscles are in the upper calf (gastrocnemius) and the lower calf (soleus).  1. Stand with the ball of your left / right foot on a step. The ball of your foot is on the walking surface, right under your toes.  2. Keep your other foot firmly on the same step.  3. Hold on to the wall or a railing for balance.  4. Slowly lift your other foot, allowing your body weight to press your left / right heel down over the edge of the step. You should feel a stretch in your left / right calf.  5. Hold this position for __________ seconds.  6. Return both feet to the step.  7. Repeat this exercise with a slight bend in your left / right knee.  Repeat __________ times with your left / right knee straight and __________ times with your left / right knee bent. Complete this exercise __________ times a day.  Balance exercise  This exercise builds your balance and strength control of your arch to help take pressure off your plantar fascia.  Single leg stand  If this exercise is too easy, you can try it with your eyes closed or while standing on a pillow.  1. Without shoes, stand near a railing or in a doorway. You may hold on to the railing or door frame as needed.  2. Stand on your left / right foot. Keep your big toe down on the floor and try to keep your arch lifted. Do not let your foot roll inward.  3. Hold this position for __________ seconds.  Repeat __________ times. Complete this exercise __________ times a day.  This information is not intended to replace advice given to you by your health care provider. Make sure  you discuss any questions you have with your health care provider.  Document Revised: 04/09/2020 Document Reviewed: 10/16/2019  Elsevier Patient Education © 2021 Elsevier Inc.

## 2021-07-06 ENCOUNTER — TELEPHONE (OUTPATIENT)
Dept: PODIATRY | Facility: CLINIC | Age: 46
End: 2021-07-06

## 2021-07-06 NOTE — TELEPHONE ENCOUNTER
Called pt to remind them of their appt on 07/07/2021, Pt did not answer the phone, and does not have voicemail. Unable to reach pt at this time.

## 2021-07-07 ENCOUNTER — OFFICE VISIT (OUTPATIENT)
Dept: PODIATRY | Facility: CLINIC | Age: 46
End: 2021-07-07

## 2021-07-07 VITALS
SYSTOLIC BLOOD PRESSURE: 130 MMHG | WEIGHT: 164 LBS | OXYGEN SATURATION: 100 % | HEART RATE: 93 BPM | HEIGHT: 64 IN | BODY MASS INDEX: 28 KG/M2 | DIASTOLIC BLOOD PRESSURE: 40 MMHG

## 2021-07-07 DIAGNOSIS — M79.671 FOOT PAIN, BILATERAL: ICD-10-CM

## 2021-07-07 DIAGNOSIS — M72.2 PLANTAR FASCIITIS, BILATERAL: Primary | ICD-10-CM

## 2021-07-07 DIAGNOSIS — M21.41 PES PLANUS OF BOTH FEET: ICD-10-CM

## 2021-07-07 DIAGNOSIS — M21.42 PES PLANUS OF BOTH FEET: ICD-10-CM

## 2021-07-07 DIAGNOSIS — M79.672 FOOT PAIN, BILATERAL: ICD-10-CM

## 2021-07-07 PROCEDURE — 99212 OFFICE O/P EST SF 10 MIN: CPT | Performed by: NURSE PRACTITIONER

## 2021-10-26 ENCOUNTER — TELEPHONE (OUTPATIENT)
Dept: PRIMARY CARE CLINIC | Age: 46
End: 2021-10-26

## 2021-10-26 NOTE — TELEPHONE ENCOUNTER
----- Message from Rondall Landau sent at 10/26/2021  9:45 AM CDT -----  Subject: Message to Provider    QUESTIONS  Information for Provider? patient says has low bp and heart feels funny   refused nurse triage   ---------------------------------------------------------------------------  --------------  [address]  What is the best way for the office to contact you? OK to leave message on   voicemail  Preferred Call Back Phone Number? [phone]  ---------------------------------------------------------------------------  --------------  SCRIPT ANSWERS  Relationship to Patient?  Self

## 2021-11-21 ENCOUNTER — OFFICE VISIT (OUTPATIENT)
Dept: URGENT CARE | Age: 46
End: 2021-11-21
Payer: COMMERCIAL

## 2021-11-21 VITALS
DIASTOLIC BLOOD PRESSURE: 84 MMHG | WEIGHT: 173 LBS | TEMPERATURE: 98 F | SYSTOLIC BLOOD PRESSURE: 146 MMHG | OXYGEN SATURATION: 99 % | BODY MASS INDEX: 29.53 KG/M2 | HEIGHT: 64 IN | HEART RATE: 88 BPM

## 2021-11-21 DIAGNOSIS — K57.92 DIVERTICULITIS: Primary | ICD-10-CM

## 2021-11-21 PROCEDURE — 99213 OFFICE O/P EST LOW 20 MIN: CPT | Performed by: NURSE PRACTITIONER

## 2021-11-21 RX ORDER — METRONIDAZOLE 500 MG/1
500 TABLET ORAL 2 TIMES DAILY
Qty: 14 TABLET | Refills: 0 | Status: SHIPPED | OUTPATIENT
Start: 2021-11-21 | End: 2021-11-28

## 2021-11-21 RX ORDER — CIPROFLOXACIN 500 MG/1
500 TABLET, FILM COATED ORAL 2 TIMES DAILY
Qty: 14 TABLET | Refills: 0 | Status: SHIPPED | OUTPATIENT
Start: 2021-11-21 | End: 2021-11-28

## 2021-11-21 ASSESSMENT — ENCOUNTER SYMPTOMS
BLOOD IN STOOL: 0
ABDOMINAL PAIN: 1
DIARRHEA: 0
NAUSEA: 1
CONSTIPATION: 0
ANAL BLEEDING: 0
RECTAL PAIN: 0
ABDOMINAL DISTENTION: 0

## 2021-11-21 NOTE — PROGRESS NOTES
[facility]  [address] [place] [address] Jaylen Babcock [address]  Dept: [phone]  Dept Fax: [phone]  Loc: [phone]    Osmani Chavez is a 51 y.o. female who presents today for her medical conditions/complaints as noted below. Osmani Chavez is c/o of Abdominal Pain (onset friday ) and Nausea        HPI:     HPI   Chief Complaint   Patient presents with    Abdominal Pain     onset friday     Nausea   low pelvic pain x 2 days. Denies constipation. No known fever. Some chills and nausea. No trouble with urination. Periods are irregular. Only about 3 this year. Still has appendix. Diverticulitis after colonoscopy last year in September. Followed up with GI afterwards and they confirmed that she had diverticulitis. She was treated with antibiotics at that time. iMPRESSION:   Impression:     Acute sigmoid diverticulitis. Severe surrounding inflammatory   stranding/phlegmon. No evidence of macro perforation or organized   drainable fluid collection/abscess. Recommend follow-up to document   resolution and exclude underlying mass as a neoplastic process could   present in a similar manner.    This report was finalized on 2020 12:29 by Dr. Geno Del Toro MD.  Specimen Collected:    Date: 20   Received From: [facility]        Past Medical History:   Diagnosis Date    C. difficile colitis     Carpal tunnel syndrome on right     Depression     Diverticulitis     Fibromyalgia     Headache(784.0)     History of blood transfusion     Irritable bowel syndrome       Past Surgical History:   Procedure Laterality Date     SECTION      x 2    COLONOSCOPY N/A 2020    Dr Baker Cogan, 5 yr recall   Jamas Roys MOUTH SURGERY         Vitals 2021 2021 2021 2021 1/15/2021    SYSTOLIC 559 718 - - - -   DIASTOLIC 84 88 - - - -   Site - Right Upper Arm - - - -   Position - Sitting - - - -   Cuff Size - Medium Adult - - - -   Pulse 88 79 - - - -   Temp 98 97.2 - - - -   Resp - 16 - - - -   SpO2 99 100 - - - -   Weight 173 lb 156 lb - - - -   Height 5' 4\" 5' 4\" - - - -   Body mass index 29.69 kg/m2 26.77 kg/m2 - - - -   Pain Level - - 0 1 0 1   Some recent data might be hidden       Family History   Problem Relation Age of Onset    Lung Cancer Mother     Heart Disease Father     Hypertension Brother     Colon Cancer Neg Hx     Colon Polyps Neg Hx     Esophageal Cancer Neg Hx     Liver Cancer Neg Hx     Liver Disease Neg Hx     Rectal Cancer Neg Hx     Stomach Cancer Neg Hx        Social History     Tobacco Use    Smoking status: Former Smoker     Packs/day: 0.50     Years: 30.00     Pack years: 15.00     Types: Cigarettes     Quit date: 02/2018     Years since quitting: 3.8    Smokeless tobacco: Never Used   Substance Use Topics    Alcohol use: No      Current Outpatient Medications on File Prior to Visit   Medication Sig Dispense Refill    amitriptyline (ELAVIL) 50 MG tablet Take 1 tablet by mouth nightly 90 tablet 3    albuterol sulfate HFA (VENTOLIN HFA) 108 (90 Base) MCG/ACT inhaler Inhale 2 puffs into the lungs every 6 hours as needed for Wheezing (Patient not taking: Reported on 11/21/2021) 1 Inhaler 3    cyclobenzaprine (FLEXERIL) 10 MG tablet TAKE ONE TABLET BY MOUTH 3 TIMES A DAY AS NEEDED FOR MUSCLES SPASMS (Patient not taking: Reported on 11/21/2021) 90 tablet 2    Multiple Vitamins-Minerals (ONE DAILY MULTIVITAMIN WOMEN PO) Take by mouth (Patient not taking: Reported on 11/21/2021)       No current facility-administered medications on file prior to visit. Allergies   Allergen Reactions    Codeine Nausea And Vomiting     Or synth.     Ms Contin [Morphine Sulfate] Other (See Comments)     Doesn't work per patient        Health Maintenance   Topic Date Due    Hepatitis C screen  Never done    HIV screen  Never done    Cervical cancer screen  07/06/2019    Flu vaccine (1) Never done  COVID-19 Vaccine (2 - Pfizer 2-dose series) 09/23/2021    DTaP/Tdap/Td vaccine (1 - Tdap) 04/26/2022 (Originally 9/1/1993)    Lipid screen  12/11/2024    Colon cancer screen colonoscopy  09/22/2025    Hepatitis A vaccine  Aged Out    Hepatitis B vaccine  Aged Out    Hib vaccine  Aged Out    Meningococcal (ACWY) vaccine  Aged Out    Pneumococcal 0-64 years Vaccine  Aged Out       Subjective:      Review of Systems   Constitutional: Positive for fever. Negative for fatigue. HENT: Negative. Gastrointestinal: Positive for abdominal pain and nausea. Negative for abdominal distention, anal bleeding, blood in stool, constipation, diarrhea and rectal pain. Objective:     Physical Exam  Vitals and nursing note reviewed. Constitutional:       Appearance: Normal appearance. She is well-developed. HENT:      Head: Normocephalic. Eyes:      Pupils: Pupils are equal, round, and reactive to light. Cardiovascular:      Rate and Rhythm: Normal rate and regular rhythm. Pulses: Normal pulses. Heart sounds: Normal heart sounds. Pulmonary:      Effort: Pulmonary effort is normal.      Breath sounds: Normal breath sounds. Abdominal:      General: Abdomen is flat. Bowel sounds are normal.      Palpations: Abdomen is soft. Tenderness: There is abdominal tenderness in the right lower quadrant and left lower quadrant. There is no right CVA tenderness, left CVA tenderness or guarding. Negative signs include Manzano's sign, Rovsing's sign, McBurney's sign and psoas sign. Musculoskeletal:      Cervical back: Normal range of motion. Skin:     General: Skin is warm and dry. Neurological:      Mental Status: She is alert and oriented to person, place, and time. Psychiatric:         Behavior: Behavior normal.         Thought Content:  Thought content normal.         Judgment: Judgment normal.       BP (!) 146/84   Pulse 88   Temp 98 °F (36.7 °C) (Temporal)   Ht 5' 4\" (1.626 m)   Wt 173 lb (78.5 kg)   SpO2 99%   BMI 29.70 kg/m²     Assessment:       Diagnosis Orders   1. Diverticulitis           Plan:   More than 50% of the time was spent counseling and coordinating care for a total time of 20min face to face. I am not convinced she has diverticulitis. It is likely that since she says symptoms are similar to her previous diverticulitis but she could have an ovarian cyst or appendicitis. We discussed different options. I have asked her to call her PCP by Tuesday if she is not 50% better to get a CT scan. We discussed reasons to go to the emergency room and she agreed. PDMP Monitoring:    Last PDMP [place] Up as Reviewed [facility]):  Review User Review Instant Review Result          Last Controlled Substance Monitoring Documentation      Office Visit from 10/4/2017 in [facility] The Prescription Monitoring Report for this patient was reviewed today. filed at 10/11/2017 1508   Periodic Controlled Substance Monitoring Possible medication side effects, risk of tolerance and/or dependence, and alternative treatments discussed.,  No signs of potential drug abuse or diversion identified.,  Existing medication contract.  filed at 10/11/2017 1508        Urine Drug Screenings (1 yr)     POCT Rapid Drug Screen  Collected: 10/4/2018 10:50 AM (Final result)    Complete Results          POCT Rapid Drug Screen  Resulted: 7/7/2017 (Final result)    Complete Results          POCT Rapid Drug Screen  Resulted: 12/29/2015 (Final result)    Complete Results          POCT Rapid Drug Screen  Resulted: 9/9/2015 (Final result)    Complete Results          POCT Rapid Drug Screen  Collected: 4/27/2015 (Final result)    Complete Results          Drug Screen, Multiple, Urine  Resulted: 4/27/2016 (Edited Result - FINAL)    Complete Results          Urine Drug Screen  Resulted: 3/22/2017 (Final result)    Complete Results          Urine Drug Screen  Resulted: 7/27/2016 (Edited Result - FINAL)    Complete Results              Medication Contract and Consent for Opioid Use Documents Filed     Patient Documents     Type of Document Status Date Received Received By Description    Medication Contract [Status Missing]  DIXIE MARCIAL Gabapentin 400mg, Ultram 50mg    Medication Contract [Status Missing]  Deepak Bald 06/10/2014    Medication Contract Signed 3/20/2018  1:23 PM Louis Mccormickjaylyn                  Patient given educational materials -see patient instructions. Discussed use, benefit, and side effects of prescribed medications. All patient questions answered. Pt voiced understanding. Reviewed health maintenance. Instructed to continue currentmedications, diet and exercise. Patient agreed with treatment plan. Follow up as directed. MEDICATIONS:  Orders Placed This Encounter   Medications    ciprofloxacin (CIPRO) 500 MG tablet     Sig: Take 1 tablet by mouth 2 times daily for 7 days     Dispense:  14 tablet     Refill:  0    metroNIDAZOLE (FLAGYL) 500 MG tablet     Sig: Take 1 tablet by mouth 2 times daily for 7 days     Dispense:  14 tablet     Refill:  0         ORDERS:  No orders of the defined types were placed in this encounter. Follow-up:  Return if symptoms worsen or fail to improve. PATIENT INSTRUCTIONS:  Patient Instructions       Patient Education   clear liquids for 24 hours to give stomach rest  If symtpoms worsen go to the ER  Start the antibiotics today  If not 50% better by Monday call pcp for visit and Ct scan. Diverticulitis: Care Instructions  Overview     Diverticulitis occurs when pouches form in the wall of the colon and become inflamed or infected. It can be very painful. Doctors aren't sure what causes diverticulitis. There is no proof that foods such as nuts, seeds, or berries cause it or make it worse. A low-fiber diet may cause the colon to work harder to push stool forward. Pouches may form because of this extra work.   It may be hard to think about healthy eating while you're in pain. But as you recover, you might think about how you can use healthy eating for overall better health. Healthy eating may help you avoid future attacks. Follow-up care is a key part of your treatment and safety. Be sure to make and go to all appointments, and call your doctor if you are having problems. It's also a good idea to know your test results and keep a list of the medicines you take. How can you care for yourself at home? · Drink plenty of fluids. If you have kidney, heart, or liver disease and have to limit fluids, talk with your doctor before you increase the amount of fluids you drink. · Stay with liquids or a bland diet (plain rice, bananas, dry toast or crackers, applesauce) until you are feeling better. Then you can return to regular foods and slowly increase the amount of fiber in your diet. · Use a heating pad set on low on your belly to relieve mild cramps and pain. · Get extra rest until you are feeling better. · Be safe with medicines. Read and follow all instructions on the label. ? If the doctor gave you a prescription medicine for pain, take it as prescribed. ? If you are not taking a prescription pain medicine, ask your doctor if you can take an over-the-counter medicine. · If your doctor prescribed antibiotics, take them as directed. Do not stop taking them just because you feel better. You need to take the full course of antibiotics. · Do not use laxatives or enemas unless your doctor tells you to use them. When should you call for help? Call your doctor now or seek immediate medical care if:    · You have a fever.     · You are vomiting.     · You have new or worse belly pain.     · You cannot pass stools or gas. Watch closely for changes in your health, and be sure to contact your doctor if you have any problems. Where can you learn more? Go to https://khadar.MakeSpace. org and sign in to your [employer]gle account.  Enter H901 in the [address] Information box to learn more about \"Diverticulitis: Care Instructions. \"     If you do not have an account, please click on the \"Sign Up Now\" link. Current as of: February 10, 2021               Content Version: 13.0  © 2127-6605 Healthwise, Incorporated. Care instructions adapted under license by [facility] ([facility]). If you have questions about a medical condition or this instruction, always ask your healthcare professional. [place] [address] any warranty or liability for your use of this information. Electronically signed by Moises Baumgarten, APRN - CNP on 11/21/2021 at 1:05 PM    EMR Dragon/transcription disclaimer:  Much of thisencounter note is electronic transcription/translation of spoken language to printed texts. The electronic translation of spoken language may be erroneous, or at times, nonsensical words or phrases may be inadvertentlytranscribed.   Although I have reviewed the note for such errors, some may still exist.

## 2021-11-21 NOTE — PATIENT INSTRUCTIONS
Patient Education   clear liquids for 24 hours to give stomach rest  If symtpoms worsen go to the ER  Start the antibiotics today  If not 50% better by Monday call pcp for visit and Ct scan. Diverticulitis: Care Instructions  Overview     Diverticulitis occurs when pouches form in the wall of the colon and become inflamed or infected. It can be very painful. Doctors aren't sure what causes diverticulitis. There is no proof that foods such as nuts, seeds, or berries cause it or make it worse. A low-fiber diet may cause the colon to work harder to push stool forward. Pouches may form because of this extra work. It may be hard to think about healthy eating while you're in pain. But as you recover, you might think about how you can use healthy eating for overall better health. Healthy eating may help you avoid future attacks. Follow-up care is a key part of your treatment and safety. Be sure to make and go to all appointments, and call your doctor if you are having problems. It's also a good idea to know your test results and keep a list of the medicines you take. How can you care for yourself at home? · Drink plenty of fluids. If you have kidney, heart, or liver disease and have to limit fluids, talk with your doctor before you increase the amount of fluids you drink. · Stay with liquids or a bland diet (plain rice, bananas, dry toast or crackers, applesauce) until you are feeling better. Then you can return to regular foods and slowly increase the amount of fiber in your diet. · Use a heating pad set on low on your belly to relieve mild cramps and pain. · Get extra rest until you are feeling better. · Be safe with medicines. Read and follow all instructions on the label. ? If the doctor gave you a prescription medicine for pain, take it as prescribed. ? If you are not taking a prescription pain medicine, ask your doctor if you can take an over-the-counter medicine.   · If your doctor prescribed antibiotics, take them as directed. Do not stop taking them just because you feel better. You need to take the full course of antibiotics. · Do not use laxatives or enemas unless your doctor tells you to use them. When should you call for help? Call your doctor now or seek immediate medical care if:    · You have a fever.     · You are vomiting.     · You have new or worse belly pain.     · You cannot pass stools or gas. Watch closely for changes in your health, and be sure to contact your doctor if you have any problems. Where can you learn more? Go to https://Omeros.Medivance. org and sign in to your QingCloud account. Enter H901 in the [employer] box to learn more about \"Diverticulitis: Care Instructions. \"     If you do not have an account, please click on the \"Sign Up Now\" link. Current as of: February 10, 2021               Content Version: 13.0  © 2006-2021 Healthwise, [facility]. Care instructions adapted under license by [facility] ([facility]). If you have questions about a medical condition or this instruction, always ask your healthcare professional. [place] [address] any warranty or liability for your use of this information.

## 2021-12-02 ENCOUNTER — OFFICE VISIT (OUTPATIENT)
Dept: PRIMARY CARE CLINIC | Age: 46
End: 2021-12-02
Payer: COMMERCIAL

## 2021-12-02 VITALS
BODY MASS INDEX: 29.09 KG/M2 | HEART RATE: 82 BPM | HEIGHT: 64 IN | WEIGHT: 170.4 LBS | DIASTOLIC BLOOD PRESSURE: 88 MMHG | OXYGEN SATURATION: 100 % | SYSTOLIC BLOOD PRESSURE: 120 MMHG | RESPIRATION RATE: 16 BRPM | TEMPERATURE: 98.7 F

## 2021-12-02 DIAGNOSIS — Z00.00 WELL WOMAN EXAM WITHOUT GYNECOLOGICAL EXAM: Primary | ICD-10-CM

## 2021-12-02 DIAGNOSIS — I10 PRIMARY HYPERTENSION: ICD-10-CM

## 2021-12-02 DIAGNOSIS — K57.92 DIVERTICULITIS: ICD-10-CM

## 2021-12-02 DIAGNOSIS — R00.2 PALPITATION: ICD-10-CM

## 2021-12-02 DIAGNOSIS — Z12.4 SCREENING FOR CERVICAL CANCER: ICD-10-CM

## 2021-12-02 LAB
ALBUMIN SERPL-MCNC: 4.5 G/DL (ref 3.5–5.2)
ALP BLD-CCNC: 66 U/L (ref 35–104)
ALT SERPL-CCNC: 21 U/L (ref 5–33)
ANION GAP SERPL CALCULATED.3IONS-SCNC: 14 MMOL/L (ref 7–19)
AST SERPL-CCNC: 27 U/L (ref 5–32)
BASOPHILS ABSOLUTE: 0 K/UL (ref 0–0.2)
BASOPHILS RELATIVE PERCENT: 0.5 % (ref 0–1)
BILIRUB SERPL-MCNC: 0.3 MG/DL (ref 0.2–1.2)
BUN BLDV-MCNC: 12 MG/DL (ref 6–20)
CALCIUM SERPL-MCNC: 9.6 MG/DL (ref 8.6–10)
CHLORIDE BLD-SCNC: 104 MMOL/L (ref 98–111)
CHOLESTEROL, TOTAL: 201 MG/DL (ref 160–199)
CO2: 23 MMOL/L (ref 22–29)
CREAT SERPL-MCNC: 0.9 MG/DL (ref 0.5–0.9)
EOSINOPHILS ABSOLUTE: 0.1 K/UL (ref 0–0.6)
EOSINOPHILS RELATIVE PERCENT: 0.8 % (ref 0–5)
GFR AFRICAN AMERICAN: >59
GFR NON-AFRICAN AMERICAN: >60
GLUCOSE BLD-MCNC: 76 MG/DL (ref 74–109)
HCT VFR BLD CALC: 43.3 % (ref 37–47)
HDLC SERPL-MCNC: 68 MG/DL (ref 65–121)
HEMOGLOBIN: 13.6 G/DL (ref 12–16)
IMMATURE GRANULOCYTES #: 0 K/UL
LDL CHOLESTEROL CALCULATED: 119 MG/DL
LYMPHOCYTES ABSOLUTE: 1.5 K/UL (ref 1.1–4.5)
LYMPHOCYTES RELATIVE PERCENT: 24.6 % (ref 20–40)
MCH RBC QN AUTO: 29.6 PG (ref 27–31)
MCHC RBC AUTO-ENTMCNC: 31.4 G/DL (ref 33–37)
MCV RBC AUTO: 94.3 FL (ref 81–99)
MONOCYTES ABSOLUTE: 0.4 K/UL (ref 0–0.9)
MONOCYTES RELATIVE PERCENT: 5.7 % (ref 0–10)
NEUTROPHILS ABSOLUTE: 4.1 K/UL (ref 1.5–7.5)
NEUTROPHILS RELATIVE PERCENT: 67.9 % (ref 50–65)
PDW BLD-RTO: 12.9 % (ref 11.5–14.5)
PLATELET # BLD: 261 K/UL (ref 130–400)
PMV BLD AUTO: 11.1 FL (ref 9.4–12.3)
POTASSIUM SERPL-SCNC: 4.1 MMOL/L (ref 3.5–5)
RBC # BLD: 4.59 M/UL (ref 4.2–5.4)
SODIUM BLD-SCNC: 141 MMOL/L (ref 136–145)
TOTAL PROTEIN: 7.5 G/DL (ref 6.6–8.7)
TRIGL SERPL-MCNC: 69 MG/DL (ref 0–149)
TSH SERPL DL<=0.05 MIU/L-ACNC: 0.9 UIU/ML (ref 0.27–4.2)
WBC # BLD: 6.1 K/UL (ref 4.8–10.8)

## 2021-12-02 PROCEDURE — 93000 ELECTROCARDIOGRAM COMPLETE: CPT | Performed by: FAMILY MEDICINE

## 2021-12-02 PROCEDURE — 99396 PREV VISIT EST AGE 40-64: CPT | Performed by: FAMILY MEDICINE

## 2021-12-02 RX ORDER — AMITRIPTYLINE HYDROCHLORIDE 50 MG/1
50 TABLET, FILM COATED ORAL NIGHTLY
Qty: 90 TABLET | Refills: 3 | Status: SHIPPED | OUTPATIENT
Start: 2021-12-02

## 2021-12-02 ASSESSMENT — ENCOUNTER SYMPTOMS
VOMITING: 0
DIARRHEA: 0
COUGH: 0
ABDOMINAL PAIN: 1
WHEEZING: 0
COLOR CHANGE: 0
NAUSEA: 0
EYE DISCHARGE: 0
BACK PAIN: 0

## 2021-12-02 NOTE — PROGRESS NOTES
SUBJECTIVE:    Patient ID: Rafael Rojas is a 51 y.o. female. HPI:   Patient is seen today for a yearly physical.  She states that overall she is doing well. She states that she feels like she had a flare up of diverticulitis a couple of weeks ago. They did send her over some antibiotics. She states that she is feeling better. She states that she was still having some twinges a couple of days ago in her lower abdomen but she states that she started her menses a couple of days ago. She thinks that is related to that. She states that she finished her antibiotics about a week ago. She is not running fever is not having chills. She denies any blood in her stool. She states that she has been eating corn and peanuts and she thinks that is what flared her diverticulitis up. She states occasionally she gets some palpitations and her blood pressures been running elevated when it has been checked recently. She states that she does monitor her blood pressure at home with a wrist cuff but she has been told that those are not real accurate. She is not currently on blood pressure medication and has never been diagnosed with hypertension in the past.    She is fasting today for labs. She states that she has not had anything to eat or drink today.     Past Medical History:   Diagnosis Date    C. difficile colitis     Carpal tunnel syndrome on right     Depression     Diverticulitis     Fibromyalgia     Headache(784.0)     History of blood transfusion     Irritable bowel syndrome       Current Outpatient Medications on File Prior to Visit   Medication Sig Dispense Refill    cyclobenzaprine (FLEXERIL) 10 MG tablet TAKE ONE TABLET BY MOUTH 3 TIMES A DAY AS NEEDED FOR MUSCLES SPASMS 90 tablet 2    albuterol sulfate HFA (VENTOLIN HFA) 108 (90 Base) MCG/ACT inhaler Inhale 2 puffs into the lungs every 6 hours as needed for Wheezing (Patient not taking: Reported on 11/21/2021) 1 Inhaler 3    Multiple Vitamins-Minerals (ONE DAILY MULTIVITAMIN WOMEN PO) Take by mouth (Patient not taking: Reported on 11/21/2021)       No current facility-administered medications on file prior to visit. Allergies   Allergen Reactions    Codeine Nausea And Vomiting     Or synth.  Ms Contin [Morphine Sulfate] Other (See Comments)     Doesn't work per patient        Review of Systems   Constitutional: Negative for activity change, appetite change and fever. HENT: Negative for congestion and nosebleeds. Eyes: Negative for discharge. Respiratory: Negative for cough and wheezing. Cardiovascular: Positive for palpitations. Negative for chest pain and leg swelling. Gastrointestinal: Positive for abdominal pain. Negative for diarrhea, nausea and vomiting. Genitourinary: Negative for difficulty urinating, frequency and urgency. Musculoskeletal: Negative for back pain and gait problem. Skin: Negative for color change and rash. Neurological: Negative for dizziness and headaches. Hematological: Does not bruise/bleed easily. Psychiatric/Behavioral: Negative for sleep disturbance and suicidal ideas. OBJECTIVE:    Physical Exam  Vitals reviewed. Constitutional:       General: She is not in acute distress. Appearance: Normal appearance. She is well-developed. She is not diaphoretic. HENT:      Head: Normocephalic and atraumatic. Right Ear: External ear normal.      Left Ear: External ear normal.   Cardiovascular:      Rate and Rhythm: Normal rate and regular rhythm. Pulses: Normal pulses. Heart sounds: Normal heart sounds. No murmur heard. Pulmonary:      Effort: Pulmonary effort is normal. No respiratory distress. Breath sounds: Normal breath sounds. Abdominal:      General: Abdomen is flat. Bowel sounds are normal.      Palpations: Abdomen is soft. Tenderness: There is no abdominal tenderness.    Musculoskeletal:      Cervical back: Normal range of motion and neck supple. Skin:     General: Skin is warm and dry. Neurological:      General: No focal deficit present. Mental Status: She is alert and oriented to person, place, and time. Mental status is at baseline. Psychiatric:         Mood and Affect: Mood normal.         Behavior: Behavior normal.         Thought Content: Thought content normal.         Judgment: Judgment normal.        /88   Pulse 82   Temp 98.7 °F (37.1 °C) (Temporal)   Resp 16   Ht 5' 4\" (1.626 m)   Wt 170 lb 6.4 oz (77.3 kg)   SpO2 100%   Breastfeeding No   BMI 29.25 kg/m²      ASSESSMENT/PLAN:    1. Well woman exam without gynecological exam  -     CBC Auto Differential  -     Comprehensive Metabolic Panel  -     Lipid Panel  -     TSH without Reflex  2. Screening for cervical cancer  -     Sindi Fisher MD, OB/GYN, [place]  -     CBC Auto Differential  -     Comprehensive Metabolic Panel  -     Lipid Panel  -     TSH without Reflex  3. Palpitation  -     EKG 12 Lead  4. Diverticulitis  5. Primary hypertension       I have ordered fasting labs on her. We will notify her of the results of these. I put a referral in for GYN for routine Pap. EKG was normal in office today. Blood pressure was better when it was rechecked. I have encouraged her to come back in in a week or 2 to have blood pressure rechecked. Follow-up with us in 1 year for a physical unless needed sooner. PDMP Monitoring:    Last PDMP Nonnie Kocher as Reviewed [facility]):  Review User Review Instant Review Result          Last Controlled Substance Monitoring Documentation      Office Visit from 10/4/2017 in [facility] The Prescription Monitoring Report for this patient was reviewed today. filed at 10/11/2017 1508   Periodic Controlled Substance Monitoring Possible medication side effects, risk of tolerance and/or dependence, and alternative treatments discussed.,  No signs of potential drug abuse or diversion identified. , Existing medication contract. filed at 10/11/2017 1508        Urine Drug Screenings (1 yr)     POCT Rapid Drug Screen  Collected: 10/4/2018 10:50 AM (Final result)    Complete Results          POCT Rapid Drug Screen  Resulted: 7/7/2017 (Final result)    Complete Results          POCT Rapid Drug Screen  Resulted: 12/29/2015 (Final result)    Complete Results          POCT Rapid Drug Screen  Resulted: 9/9/2015 (Final result)    Complete Results          POCT Rapid Drug Screen  Collected: 4/27/2015 (Final result)    Complete Results          Drug Screen, Multiple, Urine  Resulted: 4/27/2016 (Edited Result - FINAL)    Complete Results          Urine Drug Screen  Resulted: 3/22/2017 (Final result)    Complete Results          Urine Drug Screen  Resulted: 7/27/2016 (Edited Result - FINAL)    Complete Results              Medication Contract and Consent for Opioid Use Documents Filed     Patient Documents     Type of Document Status Date Received Received By Description    Medication Contract [Status Missing]  DIXIE MARCIAL Gabapentin 400mg, Ultram 50mg    Medication Contract [Status Missing]  Suin Loving 06/10/2014    Medication Contract Signed 3/20/2018  1:23 PM Mohan EDMONDS Dragon/transcription disclaimer:  Much of this encounter note is electronic transcription/translation of spoken language toprinted texts. The electronic translation of spoken language may be erroneous, or at times, nonsensical words or phrases may be inadvertently transcribed.   Although I have reviewed the note for such errors, some may stillexist.

## 2022-02-09 ENCOUNTER — OFFICE VISIT (OUTPATIENT)
Dept: PRIMARY CARE CLINIC | Age: 47
End: 2022-02-09
Payer: COMMERCIAL

## 2022-02-09 VITALS
BODY MASS INDEX: 29.02 KG/M2 | HEIGHT: 64 IN | SYSTOLIC BLOOD PRESSURE: 110 MMHG | OXYGEN SATURATION: 99 % | WEIGHT: 170 LBS | RESPIRATION RATE: 16 BRPM | DIASTOLIC BLOOD PRESSURE: 64 MMHG | TEMPERATURE: 98.3 F | HEART RATE: 103 BPM

## 2022-02-09 DIAGNOSIS — J06.9 VIRAL URI: Primary | ICD-10-CM

## 2022-02-09 LAB
INFLUENZA A ANTIBODY: NEGATIVE
INFLUENZA B ANTIBODY: POSITIVE
SARS-COV-2, PCR: DETECTED

## 2022-02-09 PROCEDURE — 99213 OFFICE O/P EST LOW 20 MIN: CPT | Performed by: NURSE PRACTITIONER

## 2022-02-09 PROCEDURE — 87804 INFLUENZA ASSAY W/OPTIC: CPT | Performed by: NURSE PRACTITIONER

## 2022-02-09 RX ORDER — OSELTAMIVIR PHOSPHATE 75 MG/1
75 CAPSULE ORAL 2 TIMES DAILY
Qty: 10 CAPSULE | Refills: 0 | Status: SHIPPED | OUTPATIENT
Start: 2022-02-09 | End: 2022-02-14

## 2022-02-09 RX ORDER — METHYLPREDNISOLONE 4 MG/1
TABLET ORAL
Qty: 1 KIT | Refills: 0 | Status: SHIPPED | OUTPATIENT
Start: 2022-02-09 | End: 2022-02-15

## 2022-02-09 NOTE — PATIENT INSTRUCTIONS
Covid Supportive Treatments  Zinc  mg daily for 5 days and then decrease to 50 mg a day. Sometimes higher dose may cause nausea  Vitamin C 1000 mg a day. Vitamin D 20 -25 mcg a day. Aspirin 81mg unless you have stomach problems or allergic  Daily antihistamine of choice ( Claritin, Allegra, or Zyrtec)  Tylenol for aches, pain and fever. You must get up and move around. Look up Covid breathing exercises. Try to lay on your stomach for periods of time to help with the fluid in your lungs. You can look up Proning on the Internet. Your provider may also send in prescriptions for symptom specific treatment. Covid is a viral pneumonia and some antibiotics will not help this. Mucinex or delsym for chest congestion. Check oxygen saturation regularly and if below 90% go to the ER. You may qualify for home oxygen and breathing treatments if your oxygen is staying around 90%. According to current [place] guidelines if you have tested positive for COVID and have symptoms you are to isolate for 10 days from the day the symptoms began. If you tested positive for COVID and have never had symptoms you must isolate for 5 days from the day of your test.  If you are not fully vaccinated or booster eligible but not yet boostered and have been in close contact with someone diagnosed with COVID quarantine from 10 days from the exposure.   This may be shortened to 5 days if you have no symptoms and tested negative for COVID on day 5

## 2022-02-09 NOTE — PROGRESS NOTES
[facility] PHYSICIAN SERVICES  [facility] [place]  [address] [address]  Dept: [phone]  Dept Fax: [phone]  Loc: [phone]    Eloise Ordoñez is a 51 y.o. female who presents today for her medical conditions/complaints as noted below. Eloise Ordoñez is c/o of Headache (patient presents today with c/o headached dizziness, low grade fever, sore throat and fatigue. She has had some dizziness. She started feeling bad about 2 days ago. )        HPI:     HPI   Chief Complaint   Patient presents with    Headache     patient presents today with c/o headached dizziness, low grade fever, sore throat and fatigue. She has had some dizziness. She started feeling bad about 2 days ago.       Past Medical History:   Diagnosis Date    C. difficile colitis     Carpal tunnel syndrome on right     Depression     Diverticulitis     Fibromyalgia     Headache(784.0)     History of blood transfusion     Irritable bowel syndrome       Past Surgical History:   Procedure Laterality Date     SECTION      x 2    COLONOSCOPY N/A 2020    Dr Felipe Solis, 5 yr recall   [facility] SURGERY         Vitals [address]   SYSTOLIC [phone] -   DIASTOLIC 64 88 [phone] 88 -   Site - - - - Right Upper Arm -   Position - - - - Sitting -   Cuff Size - - - - Medium Adult -   Pulse 103 - 82 88 79 -   Temp 98.3 - 98.7 98 97.2 -   Resp 16 - 16 - 16 -   SpO2 99 - 100 99 100 -   Weight 170 lb - 170 lb 6.4 oz 173 lb 156 lb -   Height 5' 4\" - 5' 4\" 5' 4\" 5' 4\" -   Body mass index 29.18 kg/m2 - 29.25 kg/m2 29.69 kg/m2 26.77 kg/m2 -   Pain Level - - - - - 0   Some recent data might be hidden       Family History   Problem Relation Age of Onset    Lung Cancer Mother     Heart Disease Father     Hypertension Brother     Colon Cancer Neg Hx     Colon Polyps Neg Hx     Esophageal Cancer Neg Hx     Liver Cancer Neg Hx     Liver Disease Neg Hx     Rectal Cancer Neg Hx     Stomach Cancer Neg Hx        Social History     Tobacco Use    Smoking status: Former Smoker     Packs/day: 0.50     Years: 30.00     Pack years: 15.00     Types: Cigarettes     Quit date: 2018     Years since quittin.0    Smokeless tobacco: Never Used   Substance Use Topics    Alcohol use: No      Current Outpatient Medications on File Prior to Visit   Medication Sig Dispense Refill    amitriptyline (ELAVIL) 50 MG tablet Take 1 tablet by mouth nightly 90 tablet 3    cyclobenzaprine (FLEXERIL) 10 MG tablet TAKE ONE TABLET BY MOUTH 3 TIMES A DAY AS NEEDED FOR MUSCLES SPASMS 90 tablet 2     No current facility-administered medications on file prior to visit. Allergies   Allergen Reactions    Codeine Nausea And Vomiting     Or synth.  Ms Contin [Morphine Sulfate] Other (See Comments)     Doesn't work per patient        Health Maintenance   Topic Date Due    Hepatitis C screen  Never done    Depression Monitoring  Never done    HIV screen  Never done    Cervical cancer screen  2019    COVID-19 Vaccine (3 - Booster for Pfizer series) 2022    DTaP/Tdap/Td vaccine (1 - Tdap) 2022 (Originally 1993)    Flu vaccine (1) 2022 (Originally 2021)    Colon cancer screen colonoscopy  2025    Lipid screen  2026    Hepatitis A vaccine  Aged Out    Hepatitis B vaccine  Aged Out    Hib vaccine  Aged Out    Meningococcal (ACWY) vaccine  Aged Out    Pneumococcal 0-64 years Vaccine  Aged Out       Subjective:      Review of Systems   Constitutional: Positive for fatigue and fever. HENT: Positive for congestion. Respiratory: Positive for cough. Neurological: Positive for dizziness and headaches. Objective:     Physical Exam  Vitals and nursing note reviewed. Constitutional:       Appearance: She is well-developed. HENT:      Head: Normocephalic.       Right Ear: Tympanic membrane and external ear normal.      Left Ear: Tympanic membrane and external ear normal.      Nose: Nose normal.   Eyes:      Pupils: Pupils are equal, round, and reactive to light. Cardiovascular:      Rate and Rhythm: Normal rate and regular rhythm. Heart sounds: Normal heart sounds. Pulmonary:      Effort: Pulmonary effort is normal.      Breath sounds: Normal breath sounds. Musculoskeletal:      Cervical back: Normal range of motion. Skin:     General: Skin is warm and dry. Capillary Refill: Capillary refill takes less than 2 seconds. Neurological:      General: No focal deficit present. Mental Status: She is alert and oriented to person, place, and time. Psychiatric:         Mood and Affect: Mood normal.         Behavior: Behavior normal.         Thought Content: Thought content normal.         Judgment: Judgment normal.       /64   Pulse 103   Temp 98.3 °F (36.8 °C) (Temporal)   Resp 16   Ht 5' 4\" (1.626 m)   Wt 170 lb (77.1 kg)   SpO2 99%   Breastfeeding No   BMI 29.18 kg/m²     Assessment:       Diagnosis Orders   1. Viral URI  POCT Influenza A/B         Plan:   More than 50% of the time was spent counseling and coordinating care for a total time of 22min face to face. She did test positive for flu B I am going to go ahead and send the COVID I have had people that have tested positive for both. Results for POC orders placed in visit on 02/09/22   POCT Influenza A/B   Result Value Ref Range    Influenza A Ab negative     Influenza B Ab positive      PDMP Monitoring:    Last PDMP Zay as Reviewed [facility]):  Review User Review Instant Review Result          Last Controlled Substance Monitoring Documentation      Office Visit from 10/4/2017 in [facility] The Prescription Monitoring Report for this patient was reviewed today.  filed at 10/11/2017 1508   Periodic Controlled Substance Monitoring Possible medication side effects, risk of tolerance and/or dependence, and alternative treatments discussed., No signs of potential drug abuse or diversion identified., Existing medication contract. filed at 10/11/2017 1508        Urine Drug Screenings (1 yr)     POCT Rapid Drug Screen  Collected: 10/4/2018 10:50 AM (Final result)    Complete Results          POCT Rapid Drug Screen  Resulted: 7/7/2017 (Final result)    Complete Results          POCT Rapid Drug Screen  Resulted: 12/29/2015 (Final result)    Complete Results          POCT Rapid Drug Screen  Resulted: 9/9/2015 (Final result)    Complete Results          POCT Rapid Drug Screen  Collected: 4/27/2015 (Final result)    Complete Results          Drug Screen, Multiple, Urine  Resulted: 4/27/2016 (Edited Result - FINAL)    Complete Results          Urine Drug Screen  Resulted: 3/22/2017 (Final result)    Complete Results          Urine Drug Screen  Resulted: 7/27/2016 (Edited Result - FINAL)    Complete Results              Medication Contract and Consent for Opioid Use Documents Filed     Patient Documents     Type of Document Status Date Received Received By Description    Medication Contract [Status Missing]  DIXIE MARCIAL Gabapentin 400mg, Ultram 50mg    Medication Contract [Status Missing]  Lori Gutiérrez 06/10/2014    Medication Contract Signed 3/20/2018  1:23 PM Daisy Alvarez                  Patient given educational materials -see patient instructions. Discussed use, benefit, and side effects of prescribed medications. All patient questions answered. Pt voiced understanding. Reviewed health maintenance. Instructed to continue currentmedications, diet and exercise. Patient agreed with treatment plan. Follow up as directed. MEDICATIONS:  Orders Placed This Encounter   Medications    methylPREDNISolone (MEDROL DOSEPACK) 4 MG tablet     Sig: Take by mouth.      Dispense:  1 kit     Refill:  0    oseltamivir (TAMIFLU) 75 MG capsule     Sig: Take 1 capsule by mouth 2 times daily for 5 days     Dispense:  10 capsule Refill:  0         ORDERS:  Orders Placed This Encounter   Procedures    COVID-19    POCT Influenza A/B       Follow-up:  No follow-ups on file. PATIENT INSTRUCTIONS:  Patient Instructions   Covid Supportive Treatments  Zinc  mg daily for 5 days and then decrease to 50 mg a day. Sometimes higher dose may cause nausea  Vitamin C 1000 mg a day. Vitamin D 20 -25 mcg a day. Aspirin 81mg unless you have stomach problems or allergic  Daily antihistamine of choice ( Claritin, Allegra, or Zyrtec)  Tylenol for aches, pain and fever. You must get up and move around. Look up Covid breathing exercises. Try to lay on your stomach for periods of time to help with the fluid in your lungs. You can look up Proning on the Internet. Your provider may also send in prescriptions for symptom specific treatment. Covid is a viral pneumonia and some antibiotics will not help this. Mucinex or delsym for chest congestion. Check oxygen saturation regularly and if below 90% go to the ER. You may qualify for home oxygen and breathing treatments if your oxygen is staying around 90%. According to current [place] guidelines if you have tested positive for COVID and have symptoms you are to isolate for 10 days from the day the symptoms began. If you tested positive for COVID and have never had symptoms you must isolate for 5 days from the day of your test.  If you are not fully vaccinated or booster eligible but not yet boostered and have been in close contact with someone diagnosed with COVID quarantine from 10 days from the exposure. This may be shortened to 5 days if you have no symptoms and tested negative for COVID on day 5      Electronically signed by CAMI Ascencio CNP on 2/11/2022 at 9:11 AM    EMR Dragon/transcription disclaimer:  Much of thisencounter note is electronic transcription/translation of spoken language to printed texts.   The electronic translation of spoken language may be erroneous, or at times, nonsensical words or phrases may be inadvertentlytranscribed.   Although I have reviewed the note for such errors, some may still exist.

## 2022-02-09 NOTE — LETTER
[place] [address] [address]  Phone: [phone]  Fax: [phone]    CAMI Eid CNP        February 9, 2022     Patient: Halley Roca   YOB: 1975   Date of Visit: 2/9/2022       To Whom It May Concern: It is my medical opinion that Halley Roca should remain out of work until 2-14-21 due to positive for influenza B..    If you have any questions or concerns, please don't hesitate to call.     Sincerely,        CAMI Eid CNP

## 2022-02-11 ASSESSMENT — ENCOUNTER SYMPTOMS: COUGH: 1

## 2022-12-30 ENCOUNTER — OFFICE VISIT (OUTPATIENT)
Dept: PRIMARY CARE CLINIC | Age: 47
End: 2022-12-30
Payer: COMMERCIAL

## 2022-12-30 VITALS
SYSTOLIC BLOOD PRESSURE: 104 MMHG | DIASTOLIC BLOOD PRESSURE: 68 MMHG | TEMPERATURE: 97.9 F | HEART RATE: 98 BPM | OXYGEN SATURATION: 100 %

## 2022-12-30 DIAGNOSIS — R05.9 COUGH, UNSPECIFIED TYPE: Primary | ICD-10-CM

## 2022-12-30 DIAGNOSIS — R52 BODY ACHES: ICD-10-CM

## 2022-12-30 DIAGNOSIS — J39.8 CONGESTION OF UPPER RESPIRATORY TRACT: ICD-10-CM

## 2022-12-30 LAB
INFLUENZA A ANTIBODY: NEGATIVE
INFLUENZA B ANTIBODY: NEGATIVE
SARS-COV-2, PCR: NOT DETECTED

## 2022-12-30 PROCEDURE — 87804 INFLUENZA ASSAY W/OPTIC: CPT | Performed by: NURSE PRACTITIONER

## 2022-12-30 PROCEDURE — 99213 OFFICE O/P EST LOW 20 MIN: CPT | Performed by: NURSE PRACTITIONER

## 2022-12-30 RX ORDER — BENZONATATE 200 MG/1
200 CAPSULE ORAL 3 TIMES DAILY PRN
Qty: 30 CAPSULE | Refills: 0 | Status: SHIPPED | OUTPATIENT
Start: 2022-12-30 | End: 2023-01-06

## 2022-12-30 RX ORDER — DEXTROMETHORPHAN HYDROBROMIDE AND PROMETHAZINE HYDROCHLORIDE 15; 6.25 MG/5ML; MG/5ML
SYRUP ORAL
Qty: 120 ML | Refills: 0 | Status: SHIPPED | OUTPATIENT
Start: 2022-12-30 | End: 2023-01-06

## 2022-12-30 ASSESSMENT — ENCOUNTER SYMPTOMS
COUGH: 1
EYES NEGATIVE: 1
GASTROINTESTINAL NEGATIVE: 1
SORE THROAT: 1
ALLERGIC/IMMUNOLOGIC NEGATIVE: 1

## 2022-12-30 NOTE — PROGRESS NOTES
[facility] PHYSICIAN SERVICES  [facility]  [address]  [address] [address]  Dept: [phone]  Dept Fax: [phone]  Loc: [phone]    Gely Be is a 49 y.o. female who presents today for her medical conditions/complaints as noted below. Gely Be is c/o of Cough (Symptoms started a couple days ago.), Generalized Body Aches, Congestion, and Pharyngitis        HPI:     HPI this 42-year-old female presents today for cough, general body aches, congestion, brain fog and sore throat. She states that her symptoms do not seem to be too bad other than just being absolutely fatigued and worn out. She just generally feels bad. Chief Complaint   Patient presents with    Cough     Symptoms started a couple days ago.     Generalized Body Aches    Congestion    Pharyngitis     Past Medical History:   Diagnosis Date    Anxiety     C. difficile colitis     Carpal tunnel syndrome on right     Depression     Diverticulitis     Fibromyalgia     Headache(784.0)     History of blood transfusion     Irritable bowel syndrome       Past Surgical History:   Procedure Laterality Date     SECTION      x 2    COLONOSCOPY N/A 2020    Dr Ruel Hicks, 5 yr recall    MOUTH SURGERY      TUBAL LIGATION         Vitals 2022    SYSTOLIC [phone]   DIASTOLIC 68 64 88 170 84 88   Site Left Upper Arm - - - - Right Upper Arm   Position Sitting - - - - Sitting   Cuff Size Large Adult - - - - Medium Adult   Pulse 98 103 - 82 88 79   Temp 97.9 98.3 - 98.7 98 97.2   Resp - 16 - 16 - 16   SpO2 100 99 - 100 99 100   Weight - 170 lb - 170 lb 6.4 oz 173 lb 156 lb   Height - 5' 4\" - 5' 4\" 5' 4\" 5' 4\"   Body mass index - 29.18 kg/m2 - 29.25 kg/m2 29.69 kg/m2 26.77 kg/m2   Pain Level - - - - - -   Some recent data might be hidden       Family History   Problem Relation Age of Onset    Lung Cancer Mother     Cancer Mother Lung    Heart Disease Father     Hypertension Brother     Cancer Paternal Grandfather         Lung    Arthritis Paternal Grandmother     Cancer Sister         Colon    Colon Cancer Neg Hx     Colon Polyps Neg Hx     Esophageal Cancer Neg Hx     Liver Cancer Neg Hx     Liver Disease Neg Hx     Rectal Cancer Neg Hx     Stomach Cancer Neg Hx        Social History     Tobacco Use    Smoking status: Some Days     Packs/day: 0.50     Years: 30.00     Pack years: 15.00     Types: Cigarettes     Start date: 1988     Last attempt to quit: 2018     Years since quittin.9    Smokeless tobacco: Never    Tobacco comments:     2 yrs smoking cessation [id number]   Substance Use Topics    Alcohol use: Never      Current Outpatient Medications on File Prior to Visit   Medication Sig Dispense Refill    amitriptyline (ELAVIL) 50 MG tablet Take 1 tablet by mouth nightly 90 tablet 3    cyclobenzaprine (FLEXERIL) 10 MG tablet TAKE ONE TABLET BY MOUTH 3 TIMES A DAY AS NEEDED FOR MUSCLES SPASMS 90 tablet 2     No current facility-administered medications on file prior to visit. Allergies   Allergen Reactions    Codeine Nausea And Vomiting     Or synth. Ms Contin [Morphine Sulfate] Other (See Comments)     Doesn't work per patient        Health Maintenance   Topic Date Due    Depression Monitoring  Never done    HIV screen  Never done    DTaP/Tdap/Td vaccine (6 - Tdap) 10/05/1989    Hepatitis C screen  Never done    Cervical cancer screen  2019    COVID-19 Vaccine (4 - Booster for Pfizer series) 2021    Flu vaccine (1) Never done    Colorectal Cancer Screen  2025    Lipids  2026    Hepatitis A vaccine  Aged Out    Hib vaccine  Aged Out    Meningococcal (ACWY) vaccine  Aged Out    Pneumococcal 0-64 years Vaccine  Aged Out       Subjective:      Review of Systems   Constitutional:  Positive for fatigue. Negative for chills and fever. HENT:  Positive for congestion, sneezing and sore throat. Eyes: Negative. Respiratory:  Positive for cough. Cardiovascular: Negative. Gastrointestinal: Negative. Endocrine: Negative. Genitourinary: Negative. Musculoskeletal:  Positive for myalgias. Skin: Negative. Allergic/Immunologic: Negative. Neurological: Negative. Hematological: Negative. Psychiatric/Behavioral: Negative. Objective:     Physical Exam  Vitals and nursing note reviewed. Constitutional:       General: She is not in acute distress. Appearance: Normal appearance. She is ill-appearing. She is not toxic-appearing. HENT:      Head: Normocephalic and atraumatic. Right Ear: Ear canal and external ear normal. A middle ear effusion is present. Tympanic membrane is not erythematous or bulging. Left Ear: Ear canal and external ear normal. A middle ear effusion is present. Tympanic membrane is not erythematous or bulging. Nose: Congestion and rhinorrhea present. Mouth/Throat:      Mouth: Mucous membranes are moist.      Pharynx: No posterior oropharyngeal erythema. Eyes:      Extraocular Movements: Extraocular movements intact. Conjunctiva/sclera: Conjunctivae normal.      Pupils: Pupils are equal, round, and reactive to light. Cardiovascular:      Rate and Rhythm: Normal rate and regular rhythm. Pulses: Normal pulses. Heart sounds: Normal heart sounds. No murmur heard. Pulmonary:      Effort: Pulmonary effort is normal. No respiratory distress. Breath sounds: Normal breath sounds. No wheezing, rhonchi or rales. Abdominal:      General: Bowel sounds are normal.      Palpations: Abdomen is soft. Musculoskeletal:         General: Normal range of motion. Cervical back: Normal range of motion and neck supple. Skin:     General: Skin is warm and dry. Coloration: Skin is not jaundiced or pale. Findings: No erythema or rash.    Neurological:      Mental Status: She is alert and oriented to person, place, and time. Mental status is at baseline. Psychiatric:         Mood and Affect: Mood normal.         Behavior: Behavior normal.         Thought Content: Thought content normal.         Judgment: Judgment normal.     /68 (Site: Left Upper Arm, Position: Sitting, Cuff Size: Large Adult)   Pulse 98   Temp 97.9 °F (36.6 °C)   SpO2 100%     Assessment:       Diagnosis Orders   1. Cough, unspecified type  POCT Influenza A/B    COVID-19      2. Body aches  COVID-19      3. Congestion of upper respiratory tract  COVID-19            Plan:   POCT influenza in office today results reviewed  COVID testing in office today. If COVID test is positive you need to self quarantine for 5 days at home. Then if at that time your symptoms have resolved you can go back to work but you will need to wear a mask for the next 5 days. Viral syndrome   Generally, I recommend Flonase daily for 14 days along with a potent antihistamine such as Allegra D, Zyrtec D or Claritin D for 14 days. If you have Blood pressure problems you may not be able to tolerate the D version. Other OTC nasal sprays such as saline spray, Vicks or Naso suzie can also be helpful. People with high blood pressure may use Coricidin HBP or Benadryl for sinus   symptoms. Oscillococcinum may be helpful for flu or flu-like symptoms. Supplement with Vit D3 5000 units daily, Vit C 1000 mg daily and Zinc 50 mg daily. Many illnesses are caused by viruses. These conditions usually run their course in 7-14 days. Antibiotics do not help fight viral infections and are not needed at this time. Viral syndromes are treated with symptomatic support. You may take tylenol or ibuprofen for fever or aches and pains. Stay hydrated by taking sips of water or non caffeinated, noncarbonated, and nonalcoholic beverages throughout the day. For sore throat, you may gargle with warm salt water, use lozenges or sprays.  Hot tea with honey may also be soothing to the throat. Using a daily antihistamine such as Claritin, Zyrtec or Allegra can help with upper respiratory symptoms. Benadryl can be sedating but is helpful at drying secretions and may be taken at night. For earaches, microwave a wet washcloth for 2 mins then using tongs (do not touch as it may scald you ) place cloth in ceramic cup and hold up to ear. The moist heat can be soothing to the ear. Call if you have a fever greater than 102 F or if symptoms do not improve. Your provider may also send you in prescription medications depending on your symptoms and their severity. Take all medications as directed on package unless specifically told otherwise. [facility] promethazine cough syrup may be taken 1 to 2 teaspoons every 4-6 hours at night for cough. Do not drive on this medication. Tessalon Perles may be taken up to 3 times a day as needed for cough         Patient given educational materials -see patient instructions. Discussed use, benefit, and side effects of prescribed medications. All patient questions answered. Pt voiced understanding. Reviewed health maintenance. Instructed to continue currentmedications, diet and exercise. Patient agreed with treatment plan. Follow up as directed. MEDICATIONS:  Orders Placed This Encounter   Medications    promethazine-dextromethorphan (PROMETHAZINE-DM) 6.25-15 MG/5ML syrup     Si- 2 teaspoons every 4 to 6 hours at night for cough     Dispense:  120 mL     Refill:  0    benzonatate (TESSALON) 200 MG capsule     Sig: Take 1 capsule by mouth 3 times daily as needed for Cough     Dispense:  30 capsule     Refill:  0         ORDERS:  Orders Placed This Encounter   Procedures    COVID-19    POCT Influenza A/B       Follow-up:  Return if symptoms worsen or fail to improve.     PATIENT INSTRUCTIONS:  Patient Instructions   Viral syndrome   Generally, I recommend Flonase daily for 14 days along with a potent antihistamine such as Allegra D, Zyrtec D or Claritin D for 14 days. If you have Blood pressure problems you may not be able to tolerate the D version. Other OTC nasal sprays such as saline spray, Vicks or Naso suzie can also be helpful. People with high blood pressure may use Coricidin HBP or Benadryl for sinus   symptoms. Oscillococcinum may be helpful for flu or flu-like symptoms. Supplement with Vit D3 5000 units daily, Vit C 1000 mg daily and Zinc 50 mg daily. Many illnesses are caused by viruses. These conditions usually run their course in 7-14 days. Antibiotics do not help fight viral infections and are not needed at this time. Viral syndromes are treated with symptomatic support. You may take tylenol or ibuprofen for fever or aches and pains. Stay hydrated by taking sips of water or non caffeinated, noncarbonated, and nonalcoholic beverages throughout the day. For sore throat, you may gargle with warm salt water, use lozenges or sprays. Hot tea with honey may also be soothing to the throat. Using a daily antihistamine such as Claritin, Zyrtec or Allegra can help with upper respiratory symptoms. Benadryl can be sedating but is helpful at drying secretions and may be taken at night. For earaches, microwave a wet washcloth for 2 mins then using tongs (do not touch as it may scald you ) place cloth in ceramic cup and hold up to ear. The moist heat can be soothing to the ear. Call if you have a fever greater than 102 F or if symptoms do not improve. Your provider may also send you in prescription medications depending on your symptoms and their severity. Take all medications as directed on package unless specifically told otherwise. Electronically signed by CAMI Chen NP on 12/30/2022 at 10:30 AM    EMR Dragon/transcription disclaimer:  Much of thisencounter note is electronic transcription/translation of spoken language to printed texts.   The electronic translation of spoken language may be erroneous, or at times, nonsensical words or phrases may be inadvertentlytranscribed.   Although I have reviewed the note for such errors, some may still exist.

## 2022-12-30 NOTE — PATIENT INSTRUCTIONS
Viral syndrome   Generally, I recommend Flonase daily for 14 days along with a potent antihistamine such as Allegra D, Zyrtec D or Claritin D for 14 days. If you have Blood pressure problems you may not be able to tolerate the D version. Other OTC nasal sprays such as saline spray, Vicks or Naso suzie can also be helpful. People with high blood pressure may use Coricidin HBP or Benadryl for sinus   symptoms. Oscillococcinum may be helpful for flu or flu-like symptoms. Supplement with Vit D3 5000 units daily, Vit C 1000 mg daily and Zinc 50 mg daily. Many illnesses are caused by viruses. These conditions usually run their course in 7-14 days. Antibiotics do not help fight viral infections and are not needed at this time. Viral syndromes are treated with symptomatic support. You may take tylenol or ibuprofen for fever or aches and pains. Stay hydrated by taking sips of water or non caffeinated, noncarbonated, and nonalcoholic beverages throughout the day. For sore throat, you may gargle with warm salt water, use lozenges or sprays. Hot tea with honey may also be soothing to the throat. Using a daily antihistamine such as Claritin, Zyrtec or Allegra can help with upper respiratory symptoms. Benadryl can be sedating but is helpful at drying secretions and may be taken at night. For earaches, microwave a wet washcloth for 2 mins then using tongs (do not touch as it may scald you ) place cloth in ceramic cup and hold up to ear. The moist heat can be soothing to the ear. Call if you have a fever greater than 102 F or if symptoms do not improve. Your provider may also send you in prescription medications depending on your symptoms and their severity. Take all medications as directed on package unless specifically told otherwise.

## 2022-12-30 NOTE — LETTER
[place] [address] [address] [address]  Phone: [phone]  Fax: [phone]    CAMI Su NP        December 30, 2022     Patient: Caryle Stabile   YOB: 1975   Date of Visit: 12/30/2022       To Whom it May Concern:    Caryle Stabile was seen in my clinic on 12/30/2022. She may return to work on 1/2/2023. If you have any questions or concerns, please don't hesitate to call.     Sincerely,         CAMI Su NP

## 2023-02-23 ENCOUNTER — OFFICE VISIT (OUTPATIENT)
Dept: PRIMARY CARE CLINIC | Age: 48
End: 2023-02-23
Payer: COMMERCIAL

## 2023-02-23 VITALS
SYSTOLIC BLOOD PRESSURE: 128 MMHG | HEART RATE: 84 BPM | WEIGHT: 197 LBS | BODY MASS INDEX: 33.63 KG/M2 | TEMPERATURE: 97.1 F | OXYGEN SATURATION: 100 % | DIASTOLIC BLOOD PRESSURE: 74 MMHG | HEIGHT: 64 IN

## 2023-02-23 DIAGNOSIS — R53.83 FATIGUE, UNSPECIFIED TYPE: ICD-10-CM

## 2023-02-23 DIAGNOSIS — R63.5 WEIGHT GAIN: ICD-10-CM

## 2023-02-23 DIAGNOSIS — M79.7 FIBROMYALGIA: ICD-10-CM

## 2023-02-23 DIAGNOSIS — M89.8X1 PAIN OF RIGHT SCAPULA: Primary | ICD-10-CM

## 2023-02-23 PROCEDURE — 99214 OFFICE O/P EST MOD 30 MIN: CPT | Performed by: FAMILY MEDICINE

## 2023-02-23 RX ORDER — CYCLOBENZAPRINE HCL 10 MG
TABLET ORAL
Qty: 90 TABLET | Refills: 2 | Status: SHIPPED | OUTPATIENT
Start: 2023-02-23

## 2023-02-23 RX ORDER — MELOXICAM 15 MG/1
15 TABLET ORAL DAILY
Qty: 90 TABLET | Refills: 1 | Status: SHIPPED | OUTPATIENT
Start: 2023-02-23

## 2023-02-23 SDOH — ECONOMIC STABILITY: FOOD INSECURITY: WITHIN THE PAST 12 MONTHS, THE FOOD YOU BOUGHT JUST DIDN'T LAST AND YOU DIDN'T HAVE MONEY TO GET MORE.: NEVER TRUE

## 2023-02-23 SDOH — ECONOMIC STABILITY: FOOD INSECURITY: WITHIN THE PAST 12 MONTHS, YOU WORRIED THAT YOUR FOOD WOULD RUN OUT BEFORE YOU GOT MONEY TO BUY MORE.: NEVER TRUE

## 2023-02-23 SDOH — ECONOMIC STABILITY: HOUSING INSECURITY
IN THE LAST 12 MONTHS, WAS THERE A TIME WHEN YOU DID NOT HAVE A STEADY PLACE TO SLEEP OR SLEPT IN A SHELTER (INCLUDING NOW)?: NO

## 2023-02-23 SDOH — ECONOMIC STABILITY: INCOME INSECURITY: HOW HARD IS IT FOR YOU TO PAY FOR THE VERY BASICS LIKE FOOD, HOUSING, MEDICAL CARE, AND HEATING?: NOT HARD AT ALL

## 2023-02-23 ASSESSMENT — PATIENT HEALTH QUESTIONNAIRE - PHQ9
3. TROUBLE FALLING OR STAYING ASLEEP: 0
10. IF YOU CHECKED OFF ANY PROBLEMS, HOW DIFFICULT HAVE THESE PROBLEMS MADE IT FOR YOU TO DO YOUR WORK, TAKE CARE OF THINGS AT HOME, OR GET ALONG WITH OTHER PEOPLE: 0
SUM OF ALL RESPONSES TO PHQ QUESTIONS 1-9: 0
9. THOUGHTS THAT YOU WOULD BE BETTER OFF DEAD, OR OF HURTING YOURSELF: 0
5. POOR APPETITE OR OVEREATING: 0
SUM OF ALL RESPONSES TO PHQ QUESTIONS 1-9: 0
2. FEELING DOWN, DEPRESSED OR HOPELESS: 0
SUM OF ALL RESPONSES TO PHQ9 QUESTIONS 1 & 2: 0
1. LITTLE INTEREST OR PLEASURE IN DOING THINGS: 0
1. LITTLE INTEREST OR PLEASURE IN DOING THINGS: 0
SUM OF ALL RESPONSES TO PHQ QUESTIONS 1-9: 0
6. FEELING BAD ABOUT YOURSELF - OR THAT YOU ARE A FAILURE OR HAVE LET YOURSELF OR YOUR FAMILY DOWN: 0
4. FEELING TIRED OR HAVING LITTLE ENERGY: 0
SUM OF ALL RESPONSES TO PHQ9 QUESTIONS 1 & 2: 0
SUM OF ALL RESPONSES TO PHQ QUESTIONS 1-9: 0
8. MOVING OR SPEAKING SO SLOWLY THAT OTHER PEOPLE COULD HAVE NOTICED. OR THE OPPOSITE, BEING SO FIGETY OR RESTLESS THAT YOU HAVE BEEN MOVING AROUND A LOT MORE THAN USUAL: 0
7. TROUBLE CONCENTRATING ON THINGS, SUCH AS READING THE NEWSPAPER OR WATCHING TELEVISION: 0
SUM OF ALL RESPONSES TO PHQ QUESTIONS 1-9: 0
SUM OF ALL RESPONSES TO PHQ QUESTIONS 1-9: 0
2. FEELING DOWN, DEPRESSED OR HOPELESS: 0

## 2023-03-06 ASSESSMENT — ENCOUNTER SYMPTOMS
WHEEZING: 0
VOMITING: 0
EYE DISCHARGE: 0
DIARRHEA: 0
ABDOMINAL PAIN: 0
COLOR CHANGE: 0
BACK PAIN: 0
COUGH: 0
NAUSEA: 0

## 2023-03-09 ENCOUNTER — NURSE ONLY (OUTPATIENT)
Dept: PRIMARY CARE CLINIC | Age: 48
End: 2023-03-09

## 2023-03-09 DIAGNOSIS — M79.7 FIBROMYALGIA: ICD-10-CM

## 2023-03-09 DIAGNOSIS — R53.83 FATIGUE, UNSPECIFIED TYPE: ICD-10-CM

## 2023-03-09 DIAGNOSIS — R63.5 WEIGHT GAIN: ICD-10-CM

## 2023-03-09 DIAGNOSIS — M89.8X1 PAIN OF RIGHT SCAPULA: ICD-10-CM

## 2023-03-09 DIAGNOSIS — R79.9 ABNORMAL BLOOD CHEMISTRY: Primary | ICD-10-CM

## 2023-03-09 LAB
ALBUMIN SERPL-MCNC: 4.5 G/DL (ref 3.5–5.2)
ALP BLD-CCNC: 84 U/L (ref 35–104)
ALT SERPL-CCNC: 13 U/L (ref 5–33)
ANION GAP SERPL CALCULATED.3IONS-SCNC: 11 MMOL/L (ref 7–19)
AST SERPL-CCNC: 21 U/L (ref 5–32)
BASOPHILS ABSOLUTE: 0 K/UL (ref 0–0.2)
BASOPHILS RELATIVE PERCENT: 0.6 % (ref 0–1)
BILIRUB SERPL-MCNC: <0.2 MG/DL (ref 0.2–1.2)
BUN BLDV-MCNC: 24 MG/DL (ref 6–20)
CALCIUM SERPL-MCNC: 9.7 MG/DL (ref 8.6–10)
CHLORIDE BLD-SCNC: 107 MMOL/L (ref 98–111)
CO2: 25 MMOL/L (ref 22–29)
CREAT SERPL-MCNC: 0.8 MG/DL (ref 0.5–0.9)
EOSINOPHILS ABSOLUTE: 0.1 K/UL (ref 0–0.6)
EOSINOPHILS RELATIVE PERCENT: 2.9 % (ref 0–5)
ESTRADIOL LEVEL: 18.2 PG/ML
FOLLICLE STIMULATING HORMONE: 139.1 MIU/ML
GFR SERPL CREATININE-BSD FRML MDRD: >60 ML/MIN/{1.73_M2}
GLUCOSE BLD-MCNC: 88 MG/DL (ref 74–109)
HBA1C MFR BLD: 5.1 % (ref 4–6)
HCT VFR BLD CALC: 54.6 % (ref 37–47)
HEMOGLOBIN: 17.2 G/DL (ref 12–16)
IMMATURE GRANULOCYTES #: 0 K/UL
INSULIN: 10.5 MU/L (ref 2.6–24.9)
LUTEINIZING HORMONE: 123.9 MIU/ML
LYMPHOCYTES ABSOLUTE: 1.5 K/UL (ref 1.1–4.5)
LYMPHOCYTES RELATIVE PERCENT: 32.1 % (ref 20–40)
MCH RBC QN AUTO: 29.3 PG (ref 27–31)
MCHC RBC AUTO-ENTMCNC: 31.5 G/DL (ref 33–37)
MCV RBC AUTO: 92.9 FL (ref 81–99)
MONOCYTES ABSOLUTE: 0.3 K/UL (ref 0–0.9)
MONOCYTES RELATIVE PERCENT: 5.2 % (ref 0–10)
NEUTROPHILS ABSOLUTE: 2.8 K/UL (ref 1.5–7.5)
NEUTROPHILS RELATIVE PERCENT: 59 % (ref 50–65)
PDW BLD-RTO: 13 % (ref 11.5–14.5)
PLATELET # BLD: 158 K/UL (ref 130–400)
PMV BLD AUTO: 10.9 FL (ref 9.4–12.3)
POTASSIUM SERPL-SCNC: 4.9 MMOL/L (ref 3.5–5)
RBC # BLD: 5.88 M/UL (ref 4.2–5.4)
SODIUM BLD-SCNC: 143 MMOL/L (ref 136–145)
TOTAL PROTEIN: 7.6 G/DL (ref 6.6–8.7)
TSH REFLEX FT4: 2.01 UIU/ML (ref 0.35–5.5)
WBC # BLD: 4.8 K/UL (ref 4.8–10.8)

## 2023-03-16 ENCOUNTER — HOSPITAL ENCOUNTER (OUTPATIENT)
Dept: PHYSICAL THERAPY | Age: 48
Setting detail: THERAPIES SERIES
Discharge: HOME | End: 2023-03-16
Payer: COMMERCIAL

## 2023-03-16 PROCEDURE — 97162 PT EVAL MOD COMPLEX 30 MIN: CPT

## 2023-03-16 PROCEDURE — 97140 MANUAL THERAPY 1/> REGIONS: CPT

## 2023-03-16 ASSESSMENT — PAIN DESCRIPTION - DESCRIPTORS: DESCRIPTORS: ACHING

## 2023-03-16 ASSESSMENT — PAIN DESCRIPTION - ORIENTATION: ORIENTATION: RIGHT

## 2023-03-16 ASSESSMENT — PAIN DESCRIPTION - LOCATION: LOCATION: SHOULDER;NECK

## 2023-03-16 ASSESSMENT — PAIN SCALES - GENERAL: PAINLEVEL_OUTOF10: 1

## 2023-03-16 NOTE — PROGRESS NOTES
Physical Therapy: Initial Evaluation    Patient: Gwendolyn Stewart (82 y.o. female)   Examination Date:   Plan of Care Certification Period: 3/16/2023 to 06/15/23      :  1975 ;    Confirmed: Yes MRN: [id number]  CSN: [id number]   Insurance: Payor: [facility]  / Plan: [facility]  / Product Type: *No Product type* /   Insurance ID: [id number] - (Medicaid Managed) Secondary Insurance (if applicable):    Referring Physician: MD Micheal Espinal   PCP: Eden Garza MD Visits to Date/Visits Approved: 1 /      No Show/Cancelled Appts:   /       Medical Diagnosis: Other specified disorders of bone, shoulder [M89.8X1] Pain right scapula M89.8X1  Treatment Diagnosis: Upper shoulder and thoracic paraspinal myofascial pain     PERTINENT MEDICAL HISTORY      Self reported health status[de-identified] Good    Medical History:     Past Medical History:   Diagnosis Date    Anxiety     C. difficile colitis     Carpal tunnel syndrome on right     Depression     Diverticulitis     Fibromyalgia     Headache(784.0)     History of blood transfusion     Irritable bowel syndrome      Surgical History:   Past Surgical History:   Procedure Laterality Date     SECTION      x 2    COLONOSCOPY N/A 2020    Dr Devika Chris, 5 yr recall    MOUTH SURGERY      TUBAL LIGATION         Medications:   Current Outpatient Medications:     cyclobenzaprine (FLEXERIL) 10 MG tablet, TAKE ONE TABLET BY MOUTH 3 TIMES A DAY AS NEEDED FOR MUSCLES SPASMS, Disp: 90 tablet, Rfl: 2    meloxicam (MOBIC) 15 MG tablet, Take 1 tablet by mouth daily, Disp: 90 tablet, Rfl: 1    amitriptyline (ELAVIL) 50 MG tablet, Take 1 tablet by mouth nightly, Disp: 90 tablet, Rfl: 3  Allergies: Codeine and Ms contin [morphine sulfate]      SUBJECTIVE EXAMINATION     History obtained from[de-identified] Patient, Chart Review,      Family/Caregiver Present: No    Subjective History:    Subjective: She rates pain at 1/10, up to 7/10. Pain is worse with fine motor activities. She feels her condition is worse with increased activity. She has had some relief with arthritis meds. She has trouble sleeping due to pain. She does not sleep on right side. She denies RUE strength. Additional Pertinent Hx (if applicable): 49year old female has been referred for PT eval and treat due to right scapular pain. She relates having constant right scapular pain. She has history of right shoulder pain X 10 years. No specific injury. She has treatment at this facility about 2 years ago, she was unable to complete sessions. No recent x-rays. She is working fulltime at [employer]. She performs all normal home and work chores.    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    Prior Level of Function:  Ind Independent        Current Level of Function:         ADL Assistance: Independent  Homemaking Assistance: Independent  Homemaking Responsibilities: Yes  Meal Prep Responsibility: Primary  Laundry Responsibility: Primary  Cleaning Responsibility: Primary  Bill Paying/Finance Responsibility: Primary  Shopping Responsibility: Primary  Dependent Care Responsibility: Primary  Ambulation Assistance: Independent  Transfer Assistance: Independent  Active : Yes    OBJECTIVE EXAMINATION   Restrictions:             Review of Systems:  Vision: Within Functional Limits  Hearing: Within functional limits  Overall Orientation Status: Within Normal Limits  Follows Commands: Within Functional Limits          Observations:  General Observations  Cervical: Forward head posture  Thoracic Spine: Thoracic Hyperkyphosis  Shoulders: Rounded  Description: No winging    Palpation:   Thoracic Spine Palpation: Tenderness with palpation of mid thoracic paraspinal mm, R>L. She relates this is her main source of pain.   Right Shoulder Palpation: Tenderness of lev scap, infraspinatus, and posterior deltoid mm  Left Shoulder Palpation: Tenderness of lev scap, infraspinatus, and posterior deltoid mm    Ambulation/Gait (if applicable):  Ambulation  Surface: Level tile  Device: No Device  Assistance: Independent    Neuro Screen:  Left Myotomes  Right Myotomes          Left UE Myotomes (Upper Quarter)  C1-2: Cervical Flexion: Strong  C3: Cervical Sidebending: Strong  C4: Scapular Elevation: Strong  C5: Shoulder Abduction: Strong  C5-6: Elbow Flexion: Strong  C7: Elbow Extension: Strong  C8: Ulnar Deviation/Thumb Extension: Strong  T1: Digit Abduction/Adduction: Strong Right UE Myotomes (Upper Quarter)  C1-2: Cervical Flexion: Strong  C3: Cervical Sidebending: Strong  C4: Scapular Elevation: Strong  C5: Shoulder Abduction: Strong  C5-6: Elbow Flexion: Strong  C7: Elbow Extension: Strong  C8: Ulnar Deviation/Thumb Extension: Strong  T1: Digit Abduction/Adduction: Strong     Left AROM  Right AROM      General AROM UE: Right WNL, Left WNL    General AROM UE: Right WNL, Left WNL          Left Strength  Right Strength         Strength LUE  L Shoulder Flexion: 5/5  L Shoulder Extension: 5/5  L Shoulder ABduction: 5/5  L Shoulder Internal Rotation: 5/5  L Shoulder External Rotation: 5/5  L Elbow Flexion: 5/5  L Elbow Extension: 5/5    Strength RUE  R Shoulder Flexion: 5/5  R Shoulder Extension: 5/5  R Shoulder ABduction: 5/5  R Elbow Flexion: 5/5  R Elbow Extension: 5/5       Special Tests: QuickDASH: 31.82% Impairment       Balance/Gait Assessment(s) Performed:  Not Assessed           ASSESSMENT     Impression: Assessment: Ms. Stella Marshall is seen today for initial PT eval.  She is alert and cooperative. She demonstrates the following physical therapy related body structure, function, activity limitations: 1. Multiple posture faults. 2. Postural mm weakness. 3. Upper shoulder and back pain with resultant functional compromise. Her pain presentation is consistent with ischemic postural mm pain during static activities. She is aware of PT risk/benefit and is anxious to continue PT. Body Structures, Functions, Activity Limitations Requiring Skilled Therapeutic Intervention: Decreased ADL status, Decreased tolerance to work activity, Decreased strength, Decreased high-level IADLs, Decreased posture, Increased pain    Statement of Medical Necessity: Physical Therapy is both indicated and medically necessary as outlined in the POC to increase the likelihood of meeting the functionally related goals stated below. Patient's Activity Tolerance:        Patient's rehabilitation potential/prognosis is considered to be: Good    Factors which may impact rehabilitation potential include:          GOALS   Patient Goal(s): To decrease right shoulder pain and to be able to knit  Short Term Goals Completed by 3-4 weeks Goal Status   Patient will knit for 15 min with pain <6/10. Patient will improve painfree ADL as demonstrated by improvement of QuickDASH from 32% impairment to 20% impairment. Patient will perform HEP with min cuing. Long Term Goals Completed by 6-8 weeks Goal Status   Patient will knit for 30 min with pain <6/10. Patient will improve painfree ADL as demonstrated by improvement of QuickDASH from 32% impairment to <20% impairment. Patient will perform HEP with no cuing.                                                     TREATMENT PLAN       Requires PT Follow-Up: Yes    Pt. actively involved in establishing Plan of Care and Goals: Yes  Patient/ Caregiver education and instruction:               Treatment may include any combination of the following: Current Treatment Recommendations: Strengthening, ROM, Functional mobility training, ADL/Self-care training, IADL training, Manual, Endurance training, Pain management, Home exercise program, Modalities, Positioning, Therapeutic activities, Dry needling  Modalities: Heat/Cold, E-stim - unattended     Frequency / Duration:  Patient to be seen 2 X weekly for 6-8 weeks weeks      Eval Complexity: Overall Evaluation : Medium  Decision Making: Medium Complexity  History: Personal Factors and/or Comorbidities Impacting POC: Medium  Examination of body system(s) including body structures and functions, activity limitations, and/or participation restrictions: Medium  Clinical Presentation: Medium  Clinical Decision Making : Medium Complexity    PT Treatment Completed:  Exercises:      Treatment Reasoning    Exercise 1: UBE, 3 min foward, 3 min backward. Exercise 2: Pulleys, 5 min  Exercise 3: IASTM to posterior shoulder girdle, posterior deltoids, pect minor, 5-8 min  Exercise 4: Sitting, Shoulder shrugs, posterior rows, 10 reps  Exercise 5: Supine, wand for shoulder flex, 10 reps  Exercise 6: Standing, multifidus row, Paloff press, retraction, 10 reps  Exercise 7: Standing, back to wall, arm in \"W\", maintain contact, press arms overhead, 10 reps  Exercise 8: Prone, alt arm/leg raise, 10 reps  Exercise 9: Prone thoracic mob, Grade II-III, PA pressures, 1-3 min, mid T spine  Exercise 10: Consider Dry Needling                          Therapy Time  Individual Time In: 1400       Individual Time Out: 1500  Minutes: 60  Timed Code Treatment Minutes: 60 Minutes     Therapist Signature: Mateusz Hurtado, PT    Date: 3/90/3284     I certify that the above Therapy Services are being furnished while the patient is under my care.  I agree with the treatment plan and certify that this therapy is necessary. [de-identified] Signature:  ___________________________   Date:_______                                                                   Dev Kenyon*        Physician Comments: _______________________________________________    Please sign and return to [facility] PHYSICAL THERAPY. Please fax to the location listed below.  Joanne Maxwell for this referral!    [address] [facility] PHYSICAL THERAPY  [address] [address]  Dept: [phone]  Dept Fax: Aye Abrams: [phone]       POC NOTE

## 2023-03-23 ENCOUNTER — HOSPITAL ENCOUNTER (OUTPATIENT)
Dept: PHYSICAL THERAPY | Age: 48
Setting detail: THERAPIES SERIES
Discharge: HOME | End: 2023-03-23
Payer: COMMERCIAL

## 2023-03-23 PROCEDURE — 97140 MANUAL THERAPY 1/> REGIONS: CPT

## 2023-03-23 PROCEDURE — 97110 THERAPEUTIC EXERCISES: CPT

## 2023-03-23 ASSESSMENT — PAIN SCALES - GENERAL: PAINLEVEL_OUTOF10: 3

## 2023-03-23 ASSESSMENT — PAIN DESCRIPTION - ORIENTATION: ORIENTATION: RIGHT;LEFT

## 2023-03-23 ASSESSMENT — PAIN DESCRIPTION - DESCRIPTORS: DESCRIPTORS: ACHING;BURNING

## 2023-03-23 ASSESSMENT — PAIN DESCRIPTION - LOCATION: LOCATION: BACK;SHOULDER

## 2023-03-23 NOTE — PROGRESS NOTES
Treatment Minutes: 45 Minutes     Electronically signed by Siobhan Kitchen PT  on 3/23/2023 at 3:48 PM   POC NOTE

## 2023-03-30 ENCOUNTER — HOSPITAL ENCOUNTER (OUTPATIENT)
Dept: PHYSICAL THERAPY | Age: 48
Setting detail: THERAPIES SERIES
Discharge: HOME | End: 2023-03-30
Payer: COMMERCIAL

## 2023-03-30 PROCEDURE — 97110 THERAPEUTIC EXERCISES: CPT

## 2023-03-30 ASSESSMENT — PAIN DESCRIPTION - LOCATION: LOCATION: BACK

## 2023-03-30 ASSESSMENT — PAIN SCALES - GENERAL: PAINLEVEL_OUTOF10: 1

## 2023-04-06 ENCOUNTER — HOSPITAL ENCOUNTER (OUTPATIENT)
Dept: PHYSICAL THERAPY | Age: 48
Setting detail: THERAPIES SERIES
Discharge: HOME | End: 2023-04-06
Payer: COMMERCIAL

## 2023-04-06 PROCEDURE — 97110 THERAPEUTIC EXERCISES: CPT

## 2023-04-06 PROCEDURE — 97140 MANUAL THERAPY 1/> REGIONS: CPT

## 2023-04-06 ASSESSMENT — PAIN DESCRIPTION - ORIENTATION: ORIENTATION: RIGHT;LEFT

## 2023-04-06 ASSESSMENT — PAIN SCALES - GENERAL: PAINLEVEL_OUTOF10: 2

## 2023-04-06 ASSESSMENT — PAIN DESCRIPTION - LOCATION: LOCATION: ARM;CHEST

## 2023-04-06 ASSESSMENT — PAIN DESCRIPTION - DESCRIPTORS: DESCRIPTORS: ACHING

## 2023-04-06 NOTE — PROGRESS NOTES
reps  Exercise 9: Prone thoracic mob, Grade II-III, PA pressures, 1-3 min, mid T spine  Exercise 10: Consider Dry Needling  Exercise 11: TA series on foam roller, 10 reps  Exercise 12: Standing elbow digs, 10 reps  Exercise 13: Standing, multifidus row, shoulder retraction, green t band, 10 reps  Exercise 14: Standing, small stability ball bounce/catch, 2 min                          Pt Education:         ASSESSMENT     Assessment: Assessment: She rates pain at 2/10 at beginning of PT session. She performs increasing ther ex as per flow sheet. She has fewer tender areas in paraspinal mm today. She denies pain at end of session. Body Structures, Functions, Activity Limitations Requiring Skilled Therapeutic Intervention: Decreased ADL status, Decreased tolerance to work activity, Decreased strength, Decreased high-level IADLs, Decreased posture, Increased pain    Post-Treatment Pain Level:      No data recorded  Therapy Prognosis: Good       GOALS   Patient Goals : To decrease right shoulder pain and to be able to knit  Short Term Goals Completed by 3-4 weeks Current Status Goal Status   Patient will knit for 15 min with pain <6/10. Patient will improve painfree ADL as demonstrated by improvement of QuickDASH from 32% impairment to 20% impairment. Patient will perform HEP with min cuing. Long Term Goals Completed by 6-8 weeks Current Status Goal Status   Patient will knit for 30 min with pain <6/10. Patient will improve painfree ADL as demonstrated by improvement of QuickDASH from 32% impairment to <20% impairment. Patient will perform HEP with no cuing.                                                                     TREATMENT PLAN   Plan Frequency: 2 X weekly  Plan weeks: 6-8 weeks  Current Treatment Recommendations: Strengthening, ROM, Functional mobility training, ADL/Self-care training, IADL training, Manual, Endurance

## 2023-04-19 ENCOUNTER — OFFICE VISIT (OUTPATIENT)
Dept: PRIMARY CARE CLINIC | Age: 48
End: 2023-04-19
Payer: COMMERCIAL

## 2023-04-19 VITALS
SYSTOLIC BLOOD PRESSURE: 110 MMHG | DIASTOLIC BLOOD PRESSURE: 80 MMHG | RESPIRATION RATE: 16 BRPM | WEIGHT: 199.6 LBS | HEIGHT: 64 IN | HEART RATE: 91 BPM | BODY MASS INDEX: 34.08 KG/M2 | OXYGEN SATURATION: 98 % | TEMPERATURE: 98 F

## 2023-04-19 DIAGNOSIS — R05.1 ACUTE COUGH: ICD-10-CM

## 2023-04-19 DIAGNOSIS — R09.89 CHEST CONGESTION: ICD-10-CM

## 2023-04-19 DIAGNOSIS — J40 BRONCHITIS: Primary | ICD-10-CM

## 2023-04-19 LAB — SARS-COV-2 N GENE RESP QL NAA+PROBE: NOT DETECTED

## 2023-04-19 PROCEDURE — 99213 OFFICE O/P EST LOW 20 MIN: CPT | Performed by: FAMILY MEDICINE

## 2023-04-19 RX ORDER — ALBUTEROL SULFATE 2.5 MG/3ML
2.5 SOLUTION RESPIRATORY (INHALATION) EVERY 6 HOURS PRN
Qty: 120 EACH | Refills: 3 | Status: SHIPPED | OUTPATIENT
Start: 2023-04-19

## 2023-04-19 RX ORDER — BENZONATATE 200 MG/1
200 CAPSULE ORAL 3 TIMES DAILY PRN
Qty: 30 CAPSULE | Refills: 0 | Status: SHIPPED | OUTPATIENT
Start: 2023-04-19 | End: 2023-04-26

## 2023-04-19 RX ORDER — AZITHROMYCIN 250 MG/1
250 TABLET, FILM COATED ORAL SEE ADMIN INSTRUCTIONS
Qty: 6 TABLET | Refills: 0 | Status: SHIPPED | OUTPATIENT
Start: 2023-04-19 | End: 2023-04-24

## 2023-04-19 ASSESSMENT — ENCOUNTER SYMPTOMS
SORE THROAT: 1
NAUSEA: 0
DIARRHEA: 0
SHORTNESS OF BREATH: 0
VOMITING: 0
WHEEZING: 0
ABDOMINAL PAIN: 0
COUGH: 1
BLOOD IN STOOL: 0
CHEST TIGHTNESS: 0

## 2023-04-19 NOTE — PROGRESS NOTES
Katharina Pearl (:  1975) is a 49 y.o. female,Established patient, here for evaluation of the following chief complaint(s):  Pharyngitis (Started slowly last week, got worse on Monday), Congestion, Cough (sneezing), Generalized Body Aches, and Headache (Some dizzness)         ASSESSMENT/PLAN:  1. Bronchitis  2. Acute cough  -     COVID-19  3. Chest congestion  -     COVID-19    Patient's chest exam is notable for wheezing and some rhonchi. Given this finding I do believe it would benefit patient to treat with a short course of azithromycin at this time. We will also obtain COVID-19 swab at this time to rule out potential underlying COVID. Given wheezes we will also treat patient with albuterol nebulizer solution, this is preferred by her insurance over an HFA. Benzocaine lozenges and benzonatate prescribed for symptomatic management of sore throat and cough respectively. Did discuss with patient that symptoms could very likely be viral and if this is the case antibiotics will not hasten any resolution. Did discuss with patient if COVID is positive she will need to remain quarantined for a total of 5 days with an additional 5 days of mask wearing. Many illnesses are caused by viruses. These conditions usually run their course in 7-14 days. Viral syndromes are treated with symptomatic support. You may take tylenol or ibuprofen for fever or aches and pains. Stay hydrated by taking sips of water or non caffeinated, noncarbonated, and nonalcoholic beverages throughout the day. For sore throat, you may gargle with warm salt water, use lozenges or sprays. Using a daily antihistamine such as Claritin, Zyrtec or Allegra can help with upper respiratory symptoms. Benadryl can be sedating but is helpful at drying secretions and may be taken at night. Call if you have a fever greater than 102 F or if symptoms do not improve.  Your provider may also send you in prescription medications depending on your symptoms

## 2023-04-27 ENCOUNTER — HOSPITAL ENCOUNTER (OUTPATIENT)
Dept: PHYSICAL THERAPY | Age: 48
Setting detail: THERAPIES SERIES
Discharge: HOME | End: 2023-04-27
Payer: COMMERCIAL

## 2023-04-27 PROCEDURE — 97110 THERAPEUTIC EXERCISES: CPT

## 2023-04-27 PROCEDURE — 97140 MANUAL THERAPY 1/> REGIONS: CPT

## 2023-04-27 ASSESSMENT — PAIN DESCRIPTION - LOCATION: LOCATION: BACK

## 2023-04-27 ASSESSMENT — PAIN DESCRIPTION - DESCRIPTORS: DESCRIPTORS: ACHING

## 2023-04-27 ASSESSMENT — PAIN DESCRIPTION - ORIENTATION: ORIENTATION: POSTERIOR

## 2023-04-27 ASSESSMENT — PAIN SCALES - GENERAL: PAINLEVEL_OUTOF10: 2

## 2023-04-27 ASSESSMENT — PAIN DESCRIPTION - PAIN TYPE: TYPE: CHRONIC PAIN

## 2023-04-27 NOTE — PROGRESS NOTES
Physical Therapy: Daily Note   Patient: Osmel Lee (16 y.o. female)   Examination Date:   Plan of Care/Certification Expiration Date: 06/15/23    No data recorded   :  1975 # of Visits since [facility]:   6   MRN: [id number]  CSN: [id number] Start of Care Date:   3/16/2023   Insurance: Payor: [facility]  / Plan: [facility]  / Product Type: *No Product type* /   Insurance ID: [id number] - (Medicaid Managed) Secondary Insurance (if applicable):    Referring Physician: MD Micheal Linda   PCP: Gena Coe MD Visits to Date/Visits Approved:  /      No Show/Cancelled Appts:   /       Medical Diagnosis: Other specified disorders of bone, shoulder [M89.8X1] Pain right scapula M89.8X1  Treatment Diagnosis: Upper shoulder and thoracic paraspinal myofascial pain        SUBJECTIVE EXAMINATION   Pain Level: Pain Screening  Patient Currently in Pain: Yes  Pain Assessment: 0-10  Pain Level: 2  Pain Type: Chronic pain  Pain Location: Back  Pain Orientation: Posterior  Pain Descriptors: Aching    Patient Comments: Subjective: Stiff and somewhat aggravated. I havent been here in a little bit due to my shoulder one week and nothing open for me last week. OBJECTIVE EXAMINATION       TREATMENT     Exercises:      Treatment Reasoning    Exercise 1: UBE, 3 min foward, 3 min backward.   Exercise 2: Pulleys, 5 min  Exercise 3: IASTM to posterior shoulder girdle, posterior deltoids, pect minor, 5-8 min  Exercise 4: Sitting, Shoulder shrugs, posterior rows, 10 reps  Exercise 5: Supine, wand for shoulder flex, 10 reps  Exercise 6: Standing, multifidus row, Paloff press, retraction,  green t band, 10 reps  Exercise 7: Standing, back to wall, arm in \"W\", maintain contact, press arms overhead, 10 reps  Exercise 8: Prone, alt arm/leg raise, 10 reps  Exercise 9: Prone thoracic mob, Grade II-III, PA pressures, 1-3 min, mid T spine  Exercise 10: Consider Dry Needling-NOT

## 2023-05-04 ENCOUNTER — HOSPITAL ENCOUNTER (OUTPATIENT)
Dept: PHYSICAL THERAPY | Age: 48
Setting detail: THERAPIES SERIES
Discharge: HOME | End: 2023-05-04
Payer: COMMERCIAL

## 2023-05-04 PROCEDURE — G0283 ELEC STIM OTHER THAN WOUND: HCPCS

## 2023-05-04 PROCEDURE — 97110 THERAPEUTIC EXERCISES: CPT

## 2023-05-04 PROCEDURE — 97140 MANUAL THERAPY 1/> REGIONS: CPT

## 2023-05-04 ASSESSMENT — PAIN SCALES - GENERAL: PAINLEVEL_OUTOF10: 3

## 2023-05-04 ASSESSMENT — PAIN DESCRIPTION - ORIENTATION: ORIENTATION: RIGHT;LEFT;POSTERIOR;UPPER

## 2023-05-04 ASSESSMENT — PAIN DESCRIPTION - LOCATION: LOCATION: NECK;BACK

## 2023-05-04 NOTE — PROGRESS NOTES
Physical Therapy: Daily Note   Patient: Madai Gray (12 y.o. female)   Examination Date:   Plan of Care/Certification Expiration Date: 06/15/23    No data recorded   :  1975 # of Visits since [facility]:   7   MRN: [id number]  CSN: [id number] Start of Care Date:   3/16/2023   Insurance: Payor: [facility]  / Plan: [facility]  / Product Type: *No Product type* /   Insurance ID: [id number] - (Medicaid Managed) Secondary Insurance (if applicable):    Referring Physician: MD Micheal Coulter   PCP: Bernardo Thorne MD Visits to Date/Visits Approved: 7 /      No Show/Cancelled Appts:   /       Medical Diagnosis: Other specified disorders of bone, shoulder [M89.8X1] Pain right scapula M89.8X1  Treatment Diagnosis: Upper shoulder and thoracic paraspinal myofascial pain        SUBJECTIVE EXAMINATION   Pain Level: Pain Screening  Pain Level: 3  Pain Location: Neck, Back  Pain Orientation: Right, Left, Posterior, Upper    Patient Comments: Subjective: She rates pain at 3/10. She states the pain has been distracting. HEP Compliance:   Previous treatments prior to current episode?: Medications     OBJECTIVE EXAMINATION   Restrictions:  No data recorded No data recorded No data recorded                TREATMENT     Exercises:      Treatment Reasoning    Exercise 1: UBE, 3 min foward, 3 min backward.   Exercise 2: Pulleys, 5 min  Exercise 3: IASTM to posterior shoulder girdle, posterior deltoids, pect minor, 5-8 min  Exercise 4: Sitting, Shoulder shrugs, posterior rows, 10 reps  Exercise 5: Supine, wand for shoulder flex, 10 reps  Exercise 6: Standing, multifidus row, Paloff press, retraction,  green t band, 10 reps  Exercise 7: Standing, back to wall, arm in \"W\", maintain contact, press arms overhead, 10 reps  Exercise 8: Prone, alt arm/leg raise, 10 reps  Exercise 9: Prone thoracic mob, Grade II-III, PA pressures, 1-3 min, mid T spine  Exercise 10: Consider Dry

## 2023-05-11 ENCOUNTER — HOSPITAL ENCOUNTER (OUTPATIENT)
Dept: PHYSICAL THERAPY | Age: 48
Setting detail: THERAPIES SERIES
Discharge: HOME | End: 2023-05-11
Payer: COMMERCIAL

## 2023-05-11 PROCEDURE — 97110 THERAPEUTIC EXERCISES: CPT

## 2023-05-11 PROCEDURE — 97140 MANUAL THERAPY 1/> REGIONS: CPT

## 2023-05-11 ASSESSMENT — PAIN SCALES - GENERAL: PAINLEVEL_OUTOF10: 2

## 2023-05-11 ASSESSMENT — PAIN DESCRIPTION - LOCATION: LOCATION: BACK

## 2023-05-11 NOTE — PROGRESS NOTES
TODAY  Exercise 11: TA series on MH, 10 reps  Exercise 12: Standing elbow digs, 10 reps  Exercise 13: Standing, multifidus row, shoulder retraction, green t band, 10 reps  Exercise 14: Standing, small stability ball bounce/catch, 2 min  not today  Exercise 15: HEP issued 4/27/2023  Exercise 16: Bent over row, 5#, 10 reps each  Exercise 17: IFC with MH as needed                          Pt Education:         ASSESSMENT     Assessment: Assessment: Ms. Jamal Gillespie rates pain at 2/10. She feels her condition is improving. She relates increased pain over the past 2 weeks. QuickDASH score shows increased pain/dysfunction. She has multiple thoracic and lumbar paraspinal trigger points with most painful at about T6 on right. Body Structures, Functions, Activity Limitations Requiring Skilled Therapeutic Intervention: Decreased ADL status, Decreased tolerance to work activity, Decreased strength, Decreased high-level IADLs, Decreased posture, Increased pain    Post-Treatment Pain Level:      No data recorded  Therapy Prognosis: Good       GOALS   Patient Goals : To decrease right shoulder pain and to be able to knit  Short Term Goals Completed by 3-4 weeks Current Status Goal Status   Patient will knit for 15 min with pain <6/10. She plans on knitting a sock soon. In progress   Patient will improve painfree ADL as demonstrated by improvement of QuickDASH from 32% impairment to 20% impairment. QuickDASH: 29% Met   Patient will perform HEP with min cuing. in progress In progress                                                               Long Term Goals Completed by 6-8 weeks Current Status Goal Status   Patient will knit for 30 min with pain <6/10. as above     Patient will improve painfree ADL as demonstrated by improvement of QuickDASH from 32% impairment to <20% impairment. as above     Patient will perform HEP with no cuing.  in progress                                                                  TREATMENT PLAN

## 2023-05-18 ENCOUNTER — HOSPITAL ENCOUNTER (OUTPATIENT)
Dept: PHYSICAL THERAPY | Age: 48
Setting detail: THERAPIES SERIES
Discharge: HOME | End: 2023-05-18
Payer: COMMERCIAL

## 2023-05-18 PROCEDURE — 97530 THERAPEUTIC ACTIVITIES: CPT

## 2023-05-18 PROCEDURE — 97110 THERAPEUTIC EXERCISES: CPT

## 2023-05-18 ASSESSMENT — PAIN DESCRIPTION - LOCATION: LOCATION: BACK

## 2023-05-18 ASSESSMENT — PAIN SCALES - GENERAL: PAINLEVEL_OUTOF10: 2

## 2023-05-18 ASSESSMENT — PAIN DESCRIPTION - PAIN TYPE: TYPE: CHRONIC PAIN

## 2023-05-18 ASSESSMENT — PAIN DESCRIPTION - DESCRIPTORS: DESCRIPTORS: SHOOTING;SHARP;SPASM

## 2023-05-18 ASSESSMENT — PAIN DESCRIPTION - ORIENTATION: ORIENTATION: RIGHT;LEFT

## 2023-05-18 NOTE — PROGRESS NOTES
Physical Therapy: Daily Note/Discharge Summary   Patient: Allan Erickson (62 y.o. female)   Examination Date:   Plan of Care/Certification Expiration Date: 06/15/23    No data recorded   :  1975 # of Visits since [facility]:   9   MRN: [id number]  CSN: [id number] Start of Care Date:   3/16/2023   Insurance: Payor: [facility]  / Plan: [facility]  / Product Type: *No Product type* /   Insurance ID: [id number] - (Medicaid Managed) Secondary Insurance (if applicable):    Referring Physician: MD Micheal Serrano   PCP: Catalina Amador MD Visits to Date/Visits Approved:  Show/Cancelled Appts:   /       Medical Diagnosis: Other specified disorders of bone, shoulder [M89.8X1] Pain right scapula M89.8X1  Treatment Diagnosis: Upper shoulder and thoracic paraspinal myofascial pain        SUBJECTIVE EXAMINATION   Pain Level: Pain Screening  Patient Currently in Pain: Yes  Pain Assessment: 0-10  Pain Level: 2  Best Pain Level: 0  Worst Pain Level: 4  Pain Type: Chronic pain  Pain Location: Back  Pain Orientation: Right, Left  Pain Descriptors: Shooting, Sharp, Spasm    Patient Comments: Subjective: Pain is 2/10 today. She continues to modify activities to avoid pain. HEP Compliance: Good  Previous treatments prior to current episode?: Medications     OBJECTIVE EXAMINATION   Restrictions:  No data recorded No data recorded No data recorded      Cervical Assessment     AROM Cervical Spine   Cervical Spine AROM : WNL         Shoulder AROM: WNL bilat. QuickDASH: 20% Impairment. TREATMENT     Exercises:      Treatment Reasoning    Exercise 1: UBE, 3 min foward, 3 min backward.   Not today  Exercise 2: Pulleys, 5 min  not today  Exercise 3: IASTM to posterior shoulder girdle, posterior deltoids, pect minor, 5-8 min  Exercise 4: Sitting, Shoulder shrugs, posterior rows, 10 reps  not today  Exercise 5: Supine, wand for shoulder flex, 10 reps  not today  not

## 2023-09-12 RX ORDER — MELOXICAM 15 MG/1
15 TABLET ORAL DAILY
Qty: 90 TABLET | Refills: 1 | Status: SHIPPED | OUTPATIENT
Start: 2023-09-12

## 2024-03-18 RX ORDER — MELOXICAM 15 MG/1
15 TABLET ORAL DAILY
Qty: 90 TABLET | Refills: 1 | OUTPATIENT
Start: 2024-03-18

## 2024-05-23 ENCOUNTER — OFFICE VISIT (OUTPATIENT)
Dept: PRIMARY CARE CLINIC | Age: 49
End: 2024-05-23

## 2024-05-23 VITALS
WEIGHT: 216 LBS | DIASTOLIC BLOOD PRESSURE: 74 MMHG | BODY MASS INDEX: 36.88 KG/M2 | SYSTOLIC BLOOD PRESSURE: 120 MMHG | OXYGEN SATURATION: 100 % | TEMPERATURE: 97.4 F | RESPIRATION RATE: 18 BRPM | HEART RATE: 84 BPM | HEIGHT: 64 IN

## 2024-05-23 DIAGNOSIS — K57.92 DIVERTICULITIS: Primary | ICD-10-CM

## 2024-05-23 DIAGNOSIS — R10.30 LOWER ABDOMINAL PAIN: ICD-10-CM

## 2024-05-23 PROCEDURE — 99213 OFFICE O/P EST LOW 20 MIN: CPT | Performed by: FAMILY MEDICINE

## 2024-05-23 RX ORDER — METRONIDAZOLE 500 MG/1
500 TABLET ORAL 3 TIMES DAILY
Qty: 30 TABLET | Refills: 0 | Status: SHIPPED | OUTPATIENT
Start: 2024-05-23 | End: 2024-06-02

## 2024-05-23 RX ORDER — CIPROFLOXACIN 500 MG/1
500 TABLET, FILM COATED ORAL 2 TIMES DAILY
Qty: 20 TABLET | Refills: 0 | Status: SHIPPED | OUTPATIENT
Start: 2024-05-23 | End: 2024-06-02

## 2024-05-23 SDOH — ECONOMIC STABILITY: INCOME INSECURITY: HOW HARD IS IT FOR YOU TO PAY FOR THE VERY BASICS LIKE FOOD, HOUSING, MEDICAL CARE, AND HEATING?: NOT VERY HARD

## 2024-05-23 SDOH — ECONOMIC STABILITY: FOOD INSECURITY: WITHIN THE PAST 12 MONTHS, YOU WORRIED THAT YOUR FOOD WOULD RUN OUT BEFORE YOU GOT MONEY TO BUY MORE.: NEVER TRUE

## 2024-05-23 SDOH — ECONOMIC STABILITY: FOOD INSECURITY: WITHIN THE PAST 12 MONTHS, THE FOOD YOU BOUGHT JUST DIDN'T LAST AND YOU DIDN'T HAVE MONEY TO GET MORE.: NEVER TRUE

## 2024-05-23 ASSESSMENT — PATIENT HEALTH QUESTIONNAIRE - PHQ9
10. IF YOU CHECKED OFF ANY PROBLEMS, HOW DIFFICULT HAVE THESE PROBLEMS MADE IT FOR YOU TO DO YOUR WORK, TAKE CARE OF THINGS AT HOME, OR GET ALONG WITH OTHER PEOPLE: NOT DIFFICULT AT ALL
SUM OF ALL RESPONSES TO PHQ QUESTIONS 1-9: 2
SUM OF ALL RESPONSES TO PHQ QUESTIONS 1-9: 2
SUM OF ALL RESPONSES TO PHQ9 QUESTIONS 1 & 2: 2
8. MOVING OR SPEAKING SO SLOWLY THAT OTHER PEOPLE COULD HAVE NOTICED. OR THE OPPOSITE, BEING SO FIGETY OR RESTLESS THAT YOU HAVE BEEN MOVING AROUND A LOT MORE THAN USUAL: NOT AT ALL
3. TROUBLE FALLING OR STAYING ASLEEP: NOT AT ALL
9. THOUGHTS THAT YOU WOULD BE BETTER OFF DEAD, OR OF HURTING YOURSELF: NOT AT ALL
1. LITTLE INTEREST OR PLEASURE IN DOING THINGS: SEVERAL DAYS
SUM OF ALL RESPONSES TO PHQ QUESTIONS 1-9: 2
2. FEELING DOWN, DEPRESSED OR HOPELESS: SEVERAL DAYS
6. FEELING BAD ABOUT YOURSELF - OR THAT YOU ARE A FAILURE OR HAVE LET YOURSELF OR YOUR FAMILY DOWN: NOT AT ALL
7. TROUBLE CONCENTRATING ON THINGS, SUCH AS READING THE NEWSPAPER OR WATCHING TELEVISION: NOT AT ALL
5. POOR APPETITE OR OVEREATING: NOT AT ALL
SUM OF ALL RESPONSES TO PHQ QUESTIONS 1-9: 2
4. FEELING TIRED OR HAVING LITTLE ENERGY: NOT AT ALL

## 2024-05-23 NOTE — PROGRESS NOTES
SUBJECTIVE:    Patient ID: Priyanka Nuñez is a 48 y.o. female.    HPI:   Patient is seen today for complaints of lower abdominal pain.  She has a history of diverticulitis and feels like this is likely what this is.  She states that it feels very similar to her previous episodes that she has had.  She denies any blood in her stool or dark stools.  She states that she has had a little diarrhea.  She denies any vomiting.  She did have a colonoscopy done about 3 years ago and it was normal.  She states that typically when she does antibiotics this clears up the GI symptoms.  She denies any urinary symptoms.  She has not had any flank pain    Past Medical History:   Diagnosis Date    Anxiety 2015    C. difficile colitis     Carpal tunnel syndrome on right     Depression     Diverticulitis     Fibromyalgia     Headache(784.0)     History of blood transfusion     Irritable bowel syndrome       Current Outpatient Medications on File Prior to Visit   Medication Sig Dispense Refill    meloxicam (MOBIC) 15 MG tablet TAKE 1 TABLET BY MOUTH EVERY DAY (Patient not taking: Reported on 5/23/2024) 90 tablet 1    Benzocaine 15 MG LOZG Take 1 lozenge by mouth every 3-4 hours as needed (sore throat) (Patient not taking: Reported on 5/23/2024) 25 lozenge 0    albuterol (PROVENTIL) (2.5 MG/3ML) 0.083% nebulizer solution Take 3 mLs by nebulization every 6 hours as needed for Wheezing (Patient not taking: Reported on 5/23/2024) 120 each 3    cyclobenzaprine (FLEXERIL) 10 MG tablet TAKE ONE TABLET BY MOUTH 3 TIMES A DAY AS NEEDED FOR MUSCLES SPASMS (Patient not taking: Reported on 5/23/2024) 90 tablet 2    amitriptyline (ELAVIL) 50 MG tablet Take 1 tablet by mouth nightly (Patient not taking: Reported on 5/23/2024) 90 tablet 3     No current facility-administered medications on file prior to visit.     Allergies   Allergen Reactions    Codeine Nausea And Vomiting     Or synth.    Ms Contin [Morphine Sulfate] Other (See Comments)     Doesn't

## 2024-05-29 ASSESSMENT — ENCOUNTER SYMPTOMS
EYE DISCHARGE: 0
COUGH: 0
COLOR CHANGE: 0
DIARRHEA: 0
BACK PAIN: 0
WHEEZING: 0
NAUSEA: 0
VOMITING: 0
ABDOMINAL PAIN: 1

## 2024-05-30 ENCOUNTER — OFFICE VISIT (OUTPATIENT)
Dept: PRIMARY CARE CLINIC | Age: 49
End: 2024-05-30

## 2024-05-30 VITALS
RESPIRATION RATE: 16 BRPM | HEIGHT: 64 IN | WEIGHT: 214 LBS | SYSTOLIC BLOOD PRESSURE: 122 MMHG | BODY MASS INDEX: 36.54 KG/M2 | DIASTOLIC BLOOD PRESSURE: 74 MMHG | TEMPERATURE: 97.6 F | HEART RATE: 98 BPM | OXYGEN SATURATION: 100 %

## 2024-05-30 DIAGNOSIS — K57.92 DIVERTICULITIS: Primary | ICD-10-CM

## 2024-05-30 DIAGNOSIS — R10.30 LOWER ABDOMINAL PAIN: ICD-10-CM

## 2024-05-30 LAB
ANION GAP SERPL CALCULATED.3IONS-SCNC: 18 MMOL/L (ref 7–19)
BASOPHILS # BLD: 0.1 K/UL (ref 0–0.2)
BASOPHILS NFR BLD: 0.6 % (ref 0–1)
BUN SERPL-MCNC: 15 MG/DL (ref 6–20)
CALCIUM SERPL-MCNC: 10 MG/DL (ref 8.6–10)
CHLORIDE SERPL-SCNC: 103 MMOL/L (ref 98–111)
CO2 SERPL-SCNC: 22 MMOL/L (ref 22–29)
CREAT SERPL-MCNC: 1 MG/DL (ref 0.5–0.9)
EOSINOPHIL # BLD: 0.1 K/UL (ref 0–0.6)
EOSINOPHIL NFR BLD: 1.5 % (ref 0–5)
ERYTHROCYTE [DISTWIDTH] IN BLOOD BY AUTOMATED COUNT: 13 % (ref 11.5–14.5)
GLUCOSE SERPL-MCNC: 102 MG/DL (ref 74–109)
HCT VFR BLD AUTO: 42.2 % (ref 37–47)
HGB BLD-MCNC: 13.6 G/DL (ref 12–16)
IMM GRANULOCYTES # BLD: 0 K/UL
LYMPHOCYTES # BLD: 1.9 K/UL (ref 1.1–4.5)
LYMPHOCYTES NFR BLD: 21.5 % (ref 20–40)
MCH RBC QN AUTO: 30 PG (ref 27–31)
MCHC RBC AUTO-ENTMCNC: 32.2 G/DL (ref 33–37)
MCV RBC AUTO: 93.2 FL (ref 81–99)
MONOCYTES # BLD: 0.6 K/UL (ref 0–0.9)
MONOCYTES NFR BLD: 6.6 % (ref 0–10)
NEUTROPHILS # BLD: 6.1 K/UL (ref 1.5–7.5)
NEUTS SEG NFR BLD: 69.6 % (ref 50–65)
PLATELET # BLD AUTO: 286 K/UL (ref 130–400)
PMV BLD AUTO: 10.8 FL (ref 9.4–12.3)
POTASSIUM SERPL-SCNC: 4.1 MMOL/L (ref 3.5–5)
RBC # BLD AUTO: 4.53 M/UL (ref 4.2–5.4)
SODIUM SERPL-SCNC: 143 MMOL/L (ref 136–145)
WBC # BLD AUTO: 8.8 K/UL (ref 4.8–10.8)

## 2024-05-30 PROCEDURE — 99213 OFFICE O/P EST LOW 20 MIN: CPT | Performed by: FAMILY MEDICINE

## 2024-05-30 PROCEDURE — 81002 URINALYSIS NONAUTO W/O SCOPE: CPT | Performed by: FAMILY MEDICINE

## 2024-05-30 RX ORDER — LEVOFLOXACIN 500 MG/1
500 TABLET, FILM COATED ORAL DAILY
Qty: 7 TABLET | Refills: 0 | Status: SHIPPED | OUTPATIENT
Start: 2024-05-30 | End: 2024-06-06

## 2024-05-30 ASSESSMENT — ENCOUNTER SYMPTOMS
COLOR CHANGE: 0
WHEEZING: 0
NAUSEA: 1
EYE DISCHARGE: 0
BACK PAIN: 0
VOMITING: 0
ABDOMINAL PAIN: 1
COUGH: 0
DIARRHEA: 0

## 2024-05-30 NOTE — PROGRESS NOTES
SUBJECTIVE:    Patient ID: Priyanka Nuñez is a 48 y.o. female.    HPI:   Patient is seen today for complaints of lower abdominal pain and possible diverticulitis.  She was seen last Thursday due to symptoms starting a couple days prior and was started on Cipro and Flagyl.  She is self-pay so we did not do any scanning or labs at that point she has had diverticulitis previously and she felt like that this was very similar to the episode she has had previously.  She states that she felt like it was getting better but then it woke her up from sleep this morning.  She states that it is not necessarily a significantly severe pain at this point but it is uncomfortable.  She states that it is still very much in the lower abdomen.  She states that she is not having any difficulty urinating.  Her bowel movements are more loose than normal.  She has not noticed any blood in her stool.  She has not run any fever.  She states that she just does not feel great.  She is taking the antibiotics but states that she has been nauseated with the antibiotics.    Past Medical History:   Diagnosis Date    Anxiety 2015    C. difficile colitis     Carpal tunnel syndrome on right     Depression     Diverticulitis     Fibromyalgia     Headache(784.0)     History of blood transfusion     Irritable bowel syndrome       Current Outpatient Medications on File Prior to Visit   Medication Sig Dispense Refill    metroNIDAZOLE (FLAGYL) 500 MG tablet Take 1 tablet by mouth 3 times daily for 10 days 30 tablet 0    ciprofloxacin (CIPRO) 500 MG tablet Take 1 tablet by mouth 2 times daily for 10 days 20 tablet 0    meloxicam (MOBIC) 15 MG tablet TAKE 1 TABLET BY MOUTH EVERY DAY (Patient not taking: Reported on 5/23/2024) 90 tablet 1    Benzocaine 15 MG LOZG Take 1 lozenge by mouth every 3-4 hours as needed (sore throat) (Patient not taking: Reported on 5/23/2024) 25 lozenge 0    albuterol (PROVENTIL) (2.5 MG/3ML) 0.083% nebulizer solution Take 3 mLs by

## 2024-05-31 DIAGNOSIS — R10.30 LOWER ABDOMINAL PAIN: ICD-10-CM

## 2024-05-31 DIAGNOSIS — K57.92 DIVERTICULITIS: ICD-10-CM

## 2024-05-31 LAB
APPEARANCE FLUID: CLEAR
BILIRUBIN, POC: NORMAL
CLARITY, POC: CLEAR
COLOR, POC: YELLOW
GLUCOSE URINE, POC: NORMAL
KETONES, POC: NORMAL
LEUKOCYTE EST, POC: NORMAL
NITRITE, POC: NORMAL
PH, POC: 5
PROTEIN, POC: NORMAL
SPECIFIC GRAVITY, POC: 1010
UROBILINOGEN, POC: 0.2

## 2024-06-03 DIAGNOSIS — K63.9 COLON WALL THICKENING: Primary | ICD-10-CM

## 2024-06-04 ENCOUNTER — TELEPHONE (OUTPATIENT)
Dept: PRIMARY CARE CLINIC | Age: 49
End: 2024-06-04

## 2024-06-04 DIAGNOSIS — K57.92 DIVERTICULITIS: ICD-10-CM

## 2024-06-04 DIAGNOSIS — R10.30 LOWER ABDOMINAL PAIN: Primary | ICD-10-CM

## 2024-06-04 NOTE — TELEPHONE ENCOUNTER
Can you call general surgery and see if they can get her in in the next day or 2 to be seen?  Unfortunately she is on the strongest oral antibiotic I can put her on so she is not getting better they may suggest that she go to the ER for IV antibiotics.  We will see if general surgery will see her or see if they will give us a suggestion.

## 2024-06-04 NOTE — TELEPHONE ENCOUNTER
Pt states the soonest she can get into Gastro is 07/11. Pt states she is still on clear liquid diet although she did try to eat a piece of bread last night and caused great discomfort. Pt asking for advise.

## 2024-06-04 NOTE — TELEPHONE ENCOUNTER
Please tell her that if she gets worse with the abdominal pain that she needs to go to the ER.  I feel like she is to the point that if it is getting worse she may need IV antibiotic

## 2024-06-06 ENCOUNTER — OFFICE VISIT (OUTPATIENT)
Dept: SURGERY | Age: 49
End: 2024-06-06

## 2024-06-06 VITALS
HEIGHT: 64 IN | BODY MASS INDEX: 35.85 KG/M2 | TEMPERATURE: 98.6 F | OXYGEN SATURATION: 99 % | WEIGHT: 210 LBS | HEART RATE: 111 BPM

## 2024-06-06 DIAGNOSIS — K57.92 ACUTE DIVERTICULITIS: Primary | ICD-10-CM

## 2024-06-06 PROCEDURE — 99204 OFFICE O/P NEW MOD 45 MIN: CPT | Performed by: SURGERY

## 2024-06-06 RX ORDER — AMOXICILLIN AND CLAVULANATE POTASSIUM 875; 125 MG/1; MG/1
1 TABLET, FILM COATED ORAL 2 TIMES DAILY
Qty: 14 TABLET | Refills: 0 | Status: SHIPPED | OUTPATIENT
Start: 2024-06-06 | End: 2024-06-13

## 2024-06-06 ASSESSMENT — ENCOUNTER SYMPTOMS
BACK PAIN: 0
ABDOMINAL PAIN: 1
ABDOMINAL DISTENTION: 1
EYE PAIN: 0
SHORTNESS OF BREATH: 0
NAUSEA: 1
DIARRHEA: 1
EYE REDNESS: 0
COUGH: 0

## 2024-06-06 NOTE — PROGRESS NOTES
Ms. Nuñez is a 48 year old female who presents with a complaint of abdominal pain. She states that she had a diverticulitis flare up start about 2 weeks ago. She noticed the pain coming on, and thought maybe it could go away on its own. So she switched to a clear liquid diet. It worsened, and she went to the PCP. She was prescribed oral antibiotics two weeks ago. IT got a little better, she tried some soft food, and the pain got worse. She went back to her PCP last Thursday, her abx were switched, and a CT was done. The CT did show short segment diverticulitis, no abscess or perforation. The patient states that since then, she has completed her antibiotics, and she is feeling better, pain is essentially resolved. She has been having BMs and passing flatus. She has stayed mostly on clear liquids, but has started adding in some other foods here and there. She has a referral to GI as well. Review of the patient's chart shows that her first episode of diverticulitis was 4 years ago. CT at [facility], report reviewed, showed similar findings. She had a colonoscopy at that time after recovering, with no acute findings.     Past Medical History:   Diagnosis Date    Anxiety     C. difficile colitis     Carpal tunnel syndrome on right     Depression     Diverticulitis     Fibromyalgia     Headache(784.0)     History of blood transfusion     Irritable bowel syndrome      Past Surgical History:   Procedure Laterality Date     SECTION      x 2    COLONOSCOPY N/A 2020    Dr [id number], 5 yr recall    MOUTH SURGERY      TUBAL LIGATION       Current Outpatient Medications   Medication Sig Dispense Refill    levoFLOXacin (LEVAQUIN) 500 MG tablet Take 1 tablet by mouth daily for 7 days (Patient not taking: Reported on 2024) 7 tablet 0    meloxicam (MOBIC) 15 MG tablet TAKE 1 TABLET BY MOUTH EVERY DAY (Patient not taking: Reported on 2024) 90 tablet 1    Benzocaine 15 MG LOZG Take 1

## 2024-06-10 ENCOUNTER — TELEPHONE (OUTPATIENT)
Dept: SURGERY | Age: 49
End: 2024-06-10

## 2024-06-10 NOTE — TELEPHONE ENCOUNTER
Called and spoke with the patient, she stated that she is having more pain that is an annoying pain. She stated that she hasn't been vomiting or nauseated. She stated that she has been passing bowel movents and passing gas. She said she would like to repeat a CT

## 2024-06-10 NOTE — TELEPHONE ENCOUNTER
6/10/2024 Patient called and stated she is still having pain when eating and is wanting to know what are next steps she needs to do.    Callback # [phone]  ruben

## 2024-06-11 ENCOUNTER — TELEPHONE (OUTPATIENT)
Dept: SURGERY | Age: 49
End: 2024-06-11

## 2024-06-11 DIAGNOSIS — K57.92 ACUTE DIVERTICULITIS: Primary | ICD-10-CM

## 2024-06-11 NOTE — TELEPHONE ENCOUNTER
Pt wants to have CT done @ [facility] - she would like someone to call her when it is scheduled    Cc: [facility] Gen Surg Staff

## 2024-07-11 ENCOUNTER — OFFICE VISIT (OUTPATIENT)
Dept: GASTROENTEROLOGY | Age: 49
End: 2024-07-11

## 2024-07-11 VITALS
WEIGHT: 203 LBS | HEIGHT: 64 IN | DIASTOLIC BLOOD PRESSURE: 80 MMHG | BODY MASS INDEX: 34.66 KG/M2 | HEART RATE: 81 BPM | SYSTOLIC BLOOD PRESSURE: 115 MMHG | OXYGEN SATURATION: 99 %

## 2024-07-11 DIAGNOSIS — R19.5 ABNORMAL STOOL CALIBER: ICD-10-CM

## 2024-07-11 DIAGNOSIS — R10.30 LOWER ABDOMINAL PAIN: ICD-10-CM

## 2024-07-11 DIAGNOSIS — K57.92 ACUTE DIVERTICULITIS: Primary | ICD-10-CM

## 2024-07-11 PROCEDURE — 99204 OFFICE O/P NEW MOD 45 MIN: CPT | Performed by: NURSE PRACTITIONER

## 2024-07-11 RX ORDER — IBUPROFEN 800 MG
TABLET ORAL DAILY
COMMUNITY

## 2024-07-11 ASSESSMENT — ENCOUNTER SYMPTOMS
ABDOMINAL PAIN: 1
RECTAL PAIN: 0
ABDOMINAL DISTENTION: 0
ANAL BLEEDING: 0
CHOKING: 0
DIARRHEA: 0
TROUBLE SWALLOWING: 0
BLOOD IN STOOL: 0
VOMITING: 0
COUGH: 0
SHORTNESS OF BREATH: 0
CONSTIPATION: 0
NAUSEA: 0

## 2024-07-11 NOTE — PATIENT INSTRUCTIONS
restrictions to diet and bowel prep instructions including laxatives. Please read these instructions one week prior to your scheduled procedure to ensure that you are prepared. If you have any questions regarding these instructions please call our office Mon through Fri from 8:00 am to 4:00 pm.     Follow prep instructions provided for bowel prep. Take all of the bowel prep as directed. If you are having problems with nausea, stop your prep for 30-45 min to allow the nausea to subside before resuming your prep. It is important to drink plenty of fluids throughout the day before taking your laxatives. This will help to protect your kidneys, prevent dehydration and maximize the effect of the bowel prep.     Your diet before a colonoscopy bowel preparation is very important to ensure a successful colon exam. It is recommended to consider certain changes to your diet three to four days prior to the procedure.  Remember that your bowels need to be completely empty for the exam.    What foods are good to eat?  Cut down on heavy solid foods three to four days before the procedure and start introducing lighter meals to your diet. The following food suggestions are a good part of your diet before a colonoscopy bowel preparation.  Light meat that is easily digestible such as chicken (without the skin)   Potatoes without skin   Cheese   Eggs   A light meal of steamed white fish   Light clear soups    Foods and drinks to avoid  Avoid foods that contain too much fiber. Stay clear of dark colored beverages. They can stick to the walls of the digestive tract and make it difficult to differentiate from blood. Some of these foods are:  Red meat, rice, nuts and vegetables   Milk, other milk based fluids and cream   Most fruit and puddings   Whole grain pasta   Cereals, bran and seeds   Colored beverages, especially those that are red or purple in color   Red colored Jell-O     On the day before the colonoscopy, continue to drink

## 2024-07-11 NOTE — PROGRESS NOTES
Skin:     General: Skin is warm and dry.   Neurological:      General: No focal deficit present.      Mental Status: She is alert and oriented to person, place, and time.   Psychiatric:         Mood and Affect: Mood normal.         Behavior: Behavior normal.

## 2024-08-22 ENCOUNTER — TELEPHONE (OUTPATIENT)
Dept: GASTROENTEROLOGY | Age: 49
End: 2024-08-22

## 2024-08-22 RX ORDER — DICYCLOMINE HCL 20 MG
20 TABLET ORAL 4 TIMES DAILY
Qty: 120 TABLET | Refills: 1 | Status: SHIPPED | OUTPATIENT
Start: 2024-08-22

## 2024-08-22 NOTE — TELEPHONE ENCOUNTER
08- Patient called and LVM that she is scheduled next Friday (08-)  for her colonoscopy. Complains that she is still having trouble eating-can't eat meats, breads, cooked vegetables or dairy.     Pt last seen 07- with KAYLEN ALMANZA     Called patient back   Still having quiet a bit of pain-sometimes none then at times 4 out of 10 but that it comes and goes.  Mentions that the pain is all through the colon now as that she feels in on the right, across abdomen and on the left also. Complains that the only thing that she has been able to eat is pureed vegetables and crackers.  Today she had green beans and stove top but has had a few twinges.  Patient stated that She is unable to do rice and toast but has not even tried red meats or fresh fruits at this point.       Onset Mother's day.     She denies any vomiting, temp or blood in the stool.  Her Bm's are ok but occasional constipation issues but her been drinking plenty of fluid.     Patient has done 4 rounds of antibiotics the last one being a few weeks ago after being bit by a Cat at work. Stated that the last round Augmentin was two rounds back to back.        Routed to KAYLEN ALMANZA

## 2024-08-22 NOTE — TELEPHONE ENCOUNTER
Trial of Dicyclomine 20 mg sent to pharmacy.  She can take this before meals and at bedtime.  Hopefully this will help with the pains

## 2024-08-22 NOTE — PROGRESS NOTES
Ernesto Clarke  2008  [id number]    CC: Pyelonephritis  Patient is accompanied today by mother, father    HPI:  Ernesto Clarke is a 16 y.o. female with a history of hip pain that is followed for orthopedic surgeon as well as tear of the labrum.  Patient presented to ED multiple times for flank pain and was treated for pyelonephritis however lack of evidence for positive UA was seen.  CT revealed no kidney stone or hydronephrosis or any other abnormalities at that time.  Patient notes having ongoing hip pain and is scheduled to have surgery for labrum.  Patient notes that there is pain while lifting her leg and her back.    Consultation requested by Dr. Pierson for an opinion regarding flank pain, pyelonephritis.  My final recommendations will be communicated back to the requesting physician by way of shared Medical record or letter to requesting physician via US mail.    Allergies:    Allergies   Allergen Reactions    Keflex [Cephalexin] Hives     Medications:    Current Outpatient Medications   Medication Instructions    lisdexamfetamine (VYVANSE) 20 mg, oral, Every morning    norgestimate-ethinyl estradioL (Ortho Tri-Cyclen,Trinessa) 0.18/0.215/0.25 mg-35 mcg (28) tablet 1 tablet, oral, Daily    sulfamethoxazole-trimethoprim (Bactrim) 200-40 mg/5 mL suspension 160 mg of trimethoprim, oral, 2 times daily      Past Medical History:   Past Medical History:   Diagnosis Date    Acute upper respiratory infection, unspecified 04/24/2017    Acute upper respiratory infection    Attention and concentration deficit 11/03/2015    Concentration deficit    Body mass index (BMI) pediatric, greater than or equal to 95th percentile for age 09/11/2019    BMI (body mass index), pediatric, greater than or equal to 95% for age    Childhood obesity 04/09/2024    Contact dermatitis 04/09/2024    Contusion of nose 04/09/2024    Dorsalgia, unspecified 10/14/2019    Back pain, acute    Encounter for routine child health examination  SUBJECTIVE:    Patient ID: Valerio Flores is a 36 y.o. female. HPI:   Patient presents today to establish care with us. She was previously seeing the [facility] clinic. She states that she has a history of fibromyalgia, migraines, and depression. She states that she has been on Ultram and Neurontin for the fibromyalgia and accommodation has worked well for her. She does not take them routinely but does take them 1-2 times a day depending on when she needs them. She states that these worked very well for her. She has been on Cymbalta previously and it did not do much for her. She states that she also gained weight while she was taking it. On review of patient's labs look like she had an abnormal T4. She states that she is having a lot of fatigue. She states that she has no energy to get up and do anything. She does have a history of fibromyalgia and thinks that it is related to that but her fatigue, some bearable some days. Past Medical History:   Diagnosis Date    Carpal tunnel syndrome on right     Depression     Fibromyalgia     Headache(784.0)     Irritable bowel syndrome       Current Outpatient Prescriptions   Medication Sig Dispense Refill    albuterol sulfate HFA (VENTOLIN HFA) 108 (90 Base) MCG/ACT inhaler Inhale 2 puffs into the lungs every 6 hours as needed for Wheezing 1 Inhaler 3    traMADol (ULTRAM) 50 MG tablet TAKE 1-2 TABLETS BY MOUTH EVERY 8 HOURS AS NEEDED FOR PAIN. 120 tablet 2    gabapentin (NEURONTIN) 400 MG capsule Take 1 capsule by mouth 3 times daily for 30 days.  90 capsule 2    vitamin D (ERGOCALCIFEROL) 40814 units CAPS capsule TAKE 1 CAPSULE TWICE A WEEK 6 capsule 2    topiramate (TOPAMAX) 100 MG tablet Take 1 tablet by mouth 2 times daily 180 tablet 1    cyclobenzaprine (FLEXERIL) 10 MG tablet TAKE ONE TABLET BY MOUTH 3 TIMES A DAY AS NEEDED FOR MUSCLES SPASMS 90 tablet 2    cyanocobalamin 1000 MCG/ML injection Inject 1 mL into the muscle every 14 days Please without abnormal findings 09/11/2019    Encounter for routine child health examination without abnormal findings    Generalized abdominal pain 10/14/2019    Generalized abdominal pain    Headache 06/13/2023    Impacted cerumen 04/09/2024    Infectious gastroenteritis and colitis, unspecified 11/21/2018    Diarrhea of infectious origin    Localized swelling, mass and lump, trunk 08/08/2018    Lump of skin of back    Nausea in child 06/13/2023    Otalgia, right ear 04/24/2017    Right ear pain    Other conditions influencing health status     No significant past medical history    Otitis media, unspecified, right ear 04/18/2016    Acute otitis media, right    Pediatric overweight 04/09/2024    Personal history of other diseases of the nervous system and sense organs 06/06/2016    History of conjunctivitis    Personal history of other diseases of the respiratory system 02/07/2018    History of acute pharyngitis    Personal history of other specified conditions 11/11/2019    History of abdominal pain    Poor posture 03/01/2023    Rotator cuff impingement syndrome 04/09/2024    Shoulder injury 04/09/2024    Unspecified acute noninfective otitis externa, left ear 01/07/2020    Acute otitis externa of left ear, unspecified type     Past Surgical History:  No past surgical history on file.    Social History:  Patient lives with family  Family History:  There is no history of  anomalies or malignancies, life-threatening issues with anesthesia, or bleeding/clotting problems    ROS:  General:  NEGATIVE for unexplained fevers, weight loss, pain (scale of 1-10)  Head & Neck:  NEGATIVE for vision problems, recurrent ear infections, frequent nose bleeds, snoring, strep throat in the past 6 months.  Cardiovascular:  NEGATIVE for heart murmur, history of heart defect, high blood pressure.  Respiratory:  NEGATIVE for asthma, wheezing, shortness of breath, frequent respiratory infections, seasonal allergies,  pneumonia.  Gastrointestinal:  NEGATIVE for frequent vomiting, acid reflux, abdominal pain, blood in stool, food allergies, bowel accidents, diarrhea, constipation.  Musculoskeletal:  NEGATIVE for spine problems, back pain, difficulty walking, leg weakness, numbness or tingling in the legs, joint pain or swelling.  Genitourinary:  Per HPI  Blood/Lymphatic:  NEGATIVE for swollen glands, previous blood transfusions, easing bruising, prolonged bleeding, sickle-cell disease.  Endo:  NEGATIVE for diabetes, thyroid disorders  Neurological:  NEGATIVE for seizures, learning disability, developmental delay, attention deficit hyperactivity disorder, paralysis.    Physical Exam:  I examined the patient with a guardian/chaperone present.    Vitals:  There were no vitals taken for this visit.  Constitutional:  Well-developed, well-nourished child in no acute distress  ENMT: Head atraumatic and normocephalic, mucous membranes moist without erythema  Respiratory: Normal respiratory effort, no coughing or audible wheezing.  Cardiovascular: No peripheral edema, clubbing or cyanosis  Abdomen: Soft, non-distended, non-tender with no masses  : Superficial CVA tenderness bilaterally  Rectal: Normal, orthotopic anus  Neuro:  Normal spine, no sacral dimpling or lindsey of hair, normal  and ankle strength   Musculoskeletal: Moves all extremities  Skin: Exposed skin intact without rashes or lesions  Psych:  Alert, appropriate mood and affect    Imaging/Labs:    I reviewed the patient's pertinent urologic studies  Reviewed labs from ED visit    CT abdomen pelvis wo IV contrast    Result Date: 8/19/2024  Interpreted By:  Vinod Elizondo, STUDY: CT ABDOMEN PELVIS WO IV CONTRAST;  8/19/2024 10:31 pm   INDICATION: Signs/Symptoms:Flank Pain, rule out kidney stone.   COMPARISON: None.   ACCESSION NUMBER(S): [id number]   ORDERING CLINICIAN: LISA NORMAN   TECHNIQUE: Contiguous axial images of the abdomen and pelvis were obtained without  intravenous contrast. Coronal and sagittal reformatted images were obtained from the axial images.   FINDINGS: No basilar airspace disease. No pleural effusion.   Evaluation of the abdominal viscera is limited secondary to lack of intravenous contrast. Limited evaluation for liver mass on noncontrast examination. The gallbladder is contracted and not well evaluated.   The pancreas, spleen, and adrenal glands appear unremarkable.   No evidence of renal calculus or significant hydronephrosis.   No evidence of bowel obstruction or acute appendicitis.   Urinary bladder is underdistended and not well evaluated.   Limited evaluation of the uterus and adnexa. Small amount of free fluid in the pelvis.   No acute fracture of the lumbar spine.       No evidence of renal calculus or hydronephrosis. Evaluation of the kidneys is otherwise limited secondary lack of intravenous contrast. If there is concern for urinary tract infection, clinical correlation with urinalysis is recommended.   No evidence of bowel obstruction or acute appendicitis.   Small amount of free fluid in pelvis.   MACRO: None   Signed by: Vinod Elizondo 8/19/2024 11:34 PM Dictation workstation:   [id number]    US renal complete    Result Date: 8/19/2024  Interpreted By:  Ezequiel Andrews, STUDY: US RENAL COMPLETE;  8/19/2024 4:58 pm   INDICATION: Signs/Symptoms:Continued flank pain, ultrasound 3 days ago showed findings concerning for edema of bilateral kidneys concerning for pyelonephritis, pain worse even after starting Bactrim..   COMPARISON: 08/16/2024   ACCESSION NUMBER(S): [id number]   ORDERING CLINICIAN: GREGG ROWELL   TECHNIQUE: Multiple images of the kidneys were obtained  .   FINDINGS: Right kidney measures 10.5 cm. Left kidney measures 10.6 cm. Bilateral jets detected. Possible trace right hydronephrosis on the left. No perinephric collections. Please note pyelonephritis not well interrogated by ultrasound. No renal stone seen   Bilateral bladder  jets detected.       Trace left hydronephrosis. No perinephric fluid collections please note pyelonephritis not well interrogated by ultrasound. No renal stone or renal mass. If persistent concern, consider CT   Bladder jets detected   MACRO: None   Signed by: Ezequiel Andrews 8/19/2024 5:28 PM Dictation workstation:   [id number]    US renal complete    Result Date: 8/16/2024  Interpreted By:  Silvio Tyson, STUDY: US RENAL COMPLETE;  8/16/2024 10:19 pm   INDICATION: Signs/Symptoms:bilateral flnak pain.   COMPARISON: None.   ACCESSION NUMBER(S): [id number]   ORDERING CLINICIAN: DESIRAE PARKER   TECHNIQUE: Multiple images of the kidneys were obtained  .   FINDINGS: There is parenchymal hypoechogenicity with poor corticomedullary differentiation suggestive of edema in the interpolar regions of both kidneys (images 4-7; 19-20), with corresponding decreased vascularity on the left, suspicious for bilateral acute pyelonephritis.   RIGHT KIDNEY: The right kidney measures 10.6 cm in length. No hydronephrosis is present; no evidence of nephrolithiasis.   LEFT KIDNEY: The left kidney measures 11.8 in length.  No hydronephrosis is present; no evidence of nephrolithiasis.   BLADDER: Partially distended urinary bladder is grossly unremarkable. There is no significant postvoid residual volume.       There is parenchymal hypoechogenicity with poor corticomedullary differentiation suggestive of edema in the interpolar regions of both kidneys (images 4-7; 19-20), with corresponding decreased vascularity on the left, suspicious for bilateral acute pyelonephritis. No nephrolithiasis or hydronephrosis.   Partially distended urinary bladder is grossly unremarkable. There is no significant postvoid residual volume.   MACRO: None   Signed by: Silvio Tyson 8/16/2024 10:31 PM Dictation workstation:   [id number]    MR hip right wo IV contrast    Result Date: 7/24/2024  Interpreted By:  Arvind Lucero  and Tee Louis STUDY: MRI of the   right hip  without IV contrast;  7/24/2024 9:20 am   INDICATION: Signs/Symptoms:Hip Impingement.   Per EMR: 16-year-old female active in [place] with right hip pain. Right hip radiograph showed findings suggestive of right femoroacetabular impingement. Exam positive for impingement sign, subspine impingement test.   COMPARISON: Right hip radiograph dated 06/04/2024   ACCESSION NUMBER(S): [id number]   ORDERING CLINICIAN: BEAU BORRERO   TECHNIQUE: MR imaging of the  right hip was obtained  without IV contrast.   FINDINGS: TENDONS: Mild tendinosis involving the right gluteus medius and minimus tendons without tearing. The insertion of the iliopsoas tendon is normal. The visualized hamstring tendon origin is normal.   JOINTS:   Qualitatively there is outward convexity at the anterosuperior femoral head neck junction. This appears to be greatest above the level of the center of the femoral head where the alpha angle is typically measured. There is associated increased dark T1 and T2 signal intensity with thickening of the cortex. More directly anterosuperiorly there is some surface irregularity to the femoral head neck junction cortex suggestive of a small focus of fibrocystic change. This is seen image 18 of the sagittal plane. The alpha angle measures approximately 54.5 degrees. The lateral center edge angle measures approximately 30.4 degrees. There is irregularity to the acetabular labrum in the anterior superior quadrant between the approximate 1:00 a.m. and 3:00 position, being most evident at the approximate 3:00 position with mild irregularity along the hyaline articular cartilage of the chondrolabral junction. There is a trace volume of fluid in the hip joint.     OSSEOUS STRUCTURES:   No focal marrow replacing lesions are identified. There is no fracture.   SOFT TISSUES:   No muscle atrophy or tear is seen.   INTERNAL ORGANS:   Evaluation of the internal organs of the pelvis is limited on this small field  of view study tailored for evaluation of the musculoskeletal system. No gross abnormality is seen in the pelvic organs.       1. 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 2. Anterior superior quadrant acetabular labral tearing. 3. Mild insertional gluteus medius tendinosis with mild trochanteric bursitis.   I personally reviewed the images/study and I agree with the findings as stated by Dr. Heriberto Oliveira. This study was interpreted at [place], [place].   MACRO: None   Signed by: Arvind Lucero 7/24/2024 10:20 AM Dictation workstation:   [id number]    I  did review the patient's pertinent imaging and reports    Impression/Plan:  Ernesto Clarke is a 16 y.o. year old female patient with flank pain likely related to psoas muscle tightness however other etiologies cannot be excluded.  Recommend following up with primary care doctor regarding non-urologic causes of flank pain.    Pyelonephritis  Plan:  Follow-up with PCP regarding non-urologic causes of flank pain    Krystyna Degroot MD   [facility] [place]  Adult Urology Pager: [phone]  Pediatric Urology Pager: [phone]

## 2024-09-13 ENCOUNTER — APPOINTMENT (OUTPATIENT)
Dept: OPERATING ROOM | Age: 49
End: 2024-09-13
Attending: INTERNAL MEDICINE

## 2024-09-13 ENCOUNTER — ANESTHESIA EVENT (OUTPATIENT)
Dept: OPERATING ROOM | Age: 49
End: 2024-09-13

## 2024-09-13 ENCOUNTER — HOSPITAL ENCOUNTER (OUTPATIENT)
Age: 49
Setting detail: OUTPATIENT SURGERY
Discharge: HOME | End: 2024-09-13
Attending: INTERNAL MEDICINE | Admitting: INTERNAL MEDICINE

## 2024-09-13 ENCOUNTER — ANESTHESIA (OUTPATIENT)
Dept: OPERATING ROOM | Age: 49
End: 2024-09-13

## 2024-09-13 VITALS
HEART RATE: 83 BPM | HEIGHT: 65 IN | RESPIRATION RATE: 18 BRPM | WEIGHT: 200 LBS | DIASTOLIC BLOOD PRESSURE: 89 MMHG | TEMPERATURE: 98.2 F | BODY MASS INDEX: 33.32 KG/M2 | OXYGEN SATURATION: 100 % | SYSTOLIC BLOOD PRESSURE: 137 MMHG

## 2024-09-13 PROCEDURE — 45378 DIAGNOSTIC COLONOSCOPY: CPT

## 2024-09-13 PROCEDURE — 45378 DIAGNOSTIC COLONOSCOPY: CPT | Performed by: INTERNAL MEDICINE

## 2024-09-13 RX ORDER — PROPOFOL 10 MG/ML
INJECTION, EMULSION INTRAVENOUS
Status: DISCONTINUED | OUTPATIENT
Start: 2024-09-13 | End: 2024-09-13 | Stop reason: SDUPTHER

## 2024-09-13 RX ORDER — SODIUM CHLORIDE, SODIUM LACTATE, POTASSIUM CHLORIDE, CALCIUM CHLORIDE 600; 310; 30; 20 MG/100ML; MG/100ML; MG/100ML; MG/100ML
INJECTION, SOLUTION INTRAVENOUS CONTINUOUS
Status: DISCONTINUED | OUTPATIENT
Start: 2024-09-13 | End: 2024-09-13 | Stop reason: HOSPADM

## 2024-09-13 RX ORDER — LIDOCAINE HYDROCHLORIDE 10 MG/ML
INJECTION, SOLUTION EPIDURAL; INFILTRATION; INTRACAUDAL; PERINEURAL
Status: DISCONTINUED | OUTPATIENT
Start: 2024-09-13 | End: 2024-09-13 | Stop reason: SDUPTHER

## 2024-09-13 RX ADMIN — PROPOFOL 150 MG: 10 INJECTION, EMULSION INTRAVENOUS at 14:29

## 2024-09-13 RX ADMIN — PROPOFOL 50 MG: 10 INJECTION, EMULSION INTRAVENOUS at 14:21

## 2024-09-13 RX ADMIN — LIDOCAINE HYDROCHLORIDE 30 MG: 10 INJECTION, SOLUTION EPIDURAL; INFILTRATION; INTRACAUDAL; PERINEURAL at 14:21

## 2024-09-13 RX ADMIN — SODIUM CHLORIDE, SODIUM LACTATE, POTASSIUM CHLORIDE, CALCIUM CHLORIDE: 600; 310; 30; 20 INJECTION, SOLUTION INTRAVENOUS at 13:47

## 2024-09-13 ASSESSMENT — PAIN - FUNCTIONAL ASSESSMENT
PAIN_FUNCTIONAL_ASSESSMENT: 0-10

## 2024-09-16 ENCOUNTER — TELEPHONE (OUTPATIENT)
Dept: GASTROENTEROLOGY | Age: 49
End: 2024-09-16

## 2024-09-17 RX ORDER — DICYCLOMINE HCL 20 MG
20 TABLET ORAL 4 TIMES DAILY
Qty: 360 TABLET | Refills: 3 | Status: SHIPPED | OUTPATIENT
Start: 2024-09-17

## 2024-12-23 ENCOUNTER — OFFICE VISIT (OUTPATIENT)
Dept: PRIMARY CARE CLINIC | Age: 49
End: 2024-12-23

## 2024-12-23 VITALS
DIASTOLIC BLOOD PRESSURE: 88 MMHG | WEIGHT: 204 LBS | HEIGHT: 65 IN | SYSTOLIC BLOOD PRESSURE: 138 MMHG | OXYGEN SATURATION: 100 % | TEMPERATURE: 97.7 F | HEART RATE: 113 BPM | RESPIRATION RATE: 18 BRPM | BODY MASS INDEX: 33.99 KG/M2

## 2024-12-23 DIAGNOSIS — R51.9 NONINTRACTABLE HEADACHE, UNSPECIFIED CHRONICITY PATTERN, UNSPECIFIED HEADACHE TYPE: ICD-10-CM

## 2024-12-23 DIAGNOSIS — R50.9 FEVER, UNSPECIFIED FEVER CAUSE: Primary | ICD-10-CM

## 2024-12-23 LAB
INFLUENZA VIRUS A RNA: NEGATIVE
INFLUENZA VIRUS B RNA: NEGATIVE
RSV RNA: NEGATIVE

## 2024-12-23 RX ORDER — PROMETHAZINE HYDROCHLORIDE 25 MG/ML
25 INJECTION, SOLUTION INTRAMUSCULAR; INTRAVENOUS ONCE
Status: COMPLETED | OUTPATIENT
Start: 2024-12-23 | End: 2024-12-23

## 2024-12-23 RX ADMIN — PROMETHAZINE HYDROCHLORIDE 25 MG: 25 INJECTION, SOLUTION INTRAMUSCULAR; INTRAVENOUS at 14:02

## 2024-12-23 ASSESSMENT — ENCOUNTER SYMPTOMS
VOMITING: 0
COUGH: 0
GASTROINTESTINAL NEGATIVE: 1
EYES NEGATIVE: 1
SINUS PAIN: 0
NAUSEA: 0
ALLERGIC/IMMUNOLOGIC NEGATIVE: 1
RESPIRATORY NEGATIVE: 1
DIARRHEA: 0
RHINORRHEA: 0

## 2024-12-23 NOTE — PROGRESS NOTES
As per orders of CAMI Adams, injection of Phenergan 25 mg was given in Dorsogluteal Right by Ariana Anderson CMA. Patient tolerated well. Advised to take tylenol/ibuprofen for soreness and fever. Understanding was voiced.    
to be in a public place while acutely ill.  Cover your mouth and nose for sneezing or coughing.         Phenergan injection in office today for migraine with nausea patient's  is here to drive her home.     Patient given educational materials -see patient instructions.  Discussed use, benefit, and side effects of prescribed medications.  All patient questions answered.  Pt voiced understanding. Reviewed health maintenance.  Instructed to continue currentmedications, diet and exercise.  Patient agreed with treatment plan. Follow up as directed.   MEDICATIONS:  Orders Placed This Encounter   Medications    promethazine (PHENERGAN) injection 25 mg         ORDERS:  Orders Placed This Encounter   Procedures    POCT Influenza A/B & RSV DNA       Follow-up:  Return if symptoms worsen or fail to improve, for pt may be off for 3 days .    PATIENT INSTRUCTIONS:  Patient Instructions   Viral syndrome   Generally, I recommend Flonase one spray to each side twice a day for 14 days along with a potent antihistamine such as Allegra D, Zyrtec D or Claritin D for 14 days.   If you have Blood pressure problems you may not be able to tolerate the D version.  People with high blood pressure may use Coricidin HBP or Benadryl for sinus   symptoms.   Other OTC nasal sprays such as saline spray, Vicks or Naso suzie can also be helpful.      Oscillococcinum may be helpful for flu or flu-like symptoms.     Supplement with Vit D3 5000 units daily, Vit C 1000 mg daily and Zinc 50 mg daily.     Many illnesses are caused by viruses. These conditions usually run their course in 7-14 days.  Antibiotics do not help fight viral infections and are not needed at this time. Viral syndromes are treated with symptomatic support. You may take tylenol or ibuprofen for fever or aches and pains. Stay hydrated by taking sips of water or non caffeinated, noncarbonated, and nonalcoholic beverages throughout the day. For sore throat, you may gargle with

## 2024-12-23 NOTE — PATIENT INSTRUCTIONS
infection the current recommendations indicate that if you have had a fever that you are to be at least 24 hours fever free without the use of fever reducing medications such as Tylenol or ibuprofen before returning to work or school.     Beneficial ways to avoid spreading viral infections is to :  stay at home while acutely ill   wash hands frequently   wear a mask when you have to be in a public place while acutely ill.  Cover your mouth and nose for sneezing or coughing.

## 2025-01-02 ENCOUNTER — OFFICE VISIT (OUTPATIENT)
Dept: PRIMARY CARE CLINIC | Age: 50
End: 2025-01-02
Payer: COMMERCIAL

## 2025-01-02 VITALS
HEIGHT: 65 IN | HEART RATE: 110 BPM | OXYGEN SATURATION: 99 % | RESPIRATION RATE: 18 BRPM | BODY MASS INDEX: 34.32 KG/M2 | TEMPERATURE: 97.3 F | WEIGHT: 206 LBS | DIASTOLIC BLOOD PRESSURE: 88 MMHG | SYSTOLIC BLOOD PRESSURE: 134 MMHG

## 2025-01-02 DIAGNOSIS — F33.1 MODERATE EPISODE OF RECURRENT MAJOR DEPRESSIVE DISORDER (HCC): ICD-10-CM

## 2025-01-02 DIAGNOSIS — R30.0 DYSURIA: ICD-10-CM

## 2025-01-02 DIAGNOSIS — R31.9 HEMATURIA, UNSPECIFIED TYPE: Primary | ICD-10-CM

## 2025-01-02 DIAGNOSIS — R53.83 FATIGUE, UNSPECIFIED TYPE: ICD-10-CM

## 2025-01-02 DIAGNOSIS — R35.0 URINARY FREQUENCY: ICD-10-CM

## 2025-01-02 DIAGNOSIS — R82.998 LEUKOCYTES IN URINE: ICD-10-CM

## 2025-01-02 LAB
25(OH)D3 SERPL-MCNC: 31.8 NG/ML
ALBUMIN SERPL-MCNC: 4.4 G/DL (ref 3.5–5.2)
ALP SERPL-CCNC: 102 U/L (ref 35–104)
ALT SERPL-CCNC: 38 U/L (ref 5–33)
ANION GAP SERPL CALCULATED.3IONS-SCNC: 14 MMOL/L (ref 7–19)
APPEARANCE FLUID: NORMAL
AST SERPL-CCNC: 28 U/L (ref 5–32)
BASOPHILS # BLD: 0 K/UL (ref 0–0.2)
BASOPHILS NFR BLD: 0.7 % (ref 0–1)
BILIRUB SERPL-MCNC: 0.2 MG/DL (ref 0.2–1.2)
BILIRUBIN, POC: NORMAL
BLOOD URINE, POC: NORMAL
BUN SERPL-MCNC: 16 MG/DL (ref 6–20)
CALCIUM SERPL-MCNC: 9.9 MG/DL (ref 8.6–10)
CHLORIDE SERPL-SCNC: 102 MMOL/L (ref 98–111)
CLARITY, POC: NORMAL
CO2 SERPL-SCNC: 26 MMOL/L (ref 22–29)
COLOR, POC: YELLOW
CREAT SERPL-MCNC: 1.3 MG/DL (ref 0.5–0.9)
EOSINOPHIL # BLD: 0.1 K/UL (ref 0–0.6)
EOSINOPHIL NFR BLD: 1.7 % (ref 0–5)
ERYTHROCYTE [DISTWIDTH] IN BLOOD BY AUTOMATED COUNT: 12.4 % (ref 11.5–14.5)
GLUCOSE SERPL-MCNC: 92 MG/DL (ref 70–99)
GLUCOSE URINE, POC: NORMAL MG/DL
HCT VFR BLD AUTO: 43.9 % (ref 37–47)
HGB BLD-MCNC: 13.9 G/DL (ref 12–16)
IMM GRANULOCYTES # BLD: 0 K/UL
KETONES, POC: NORMAL MG/DL
LEUKOCYTE EST, POC: NORMAL
LYMPHOCYTES # BLD: 1.9 K/UL (ref 1.1–4.5)
LYMPHOCYTES NFR BLD: 34.7 % (ref 20–40)
MCH RBC QN AUTO: 29.4 PG (ref 27–31)
MCHC RBC AUTO-ENTMCNC: 31.7 G/DL (ref 33–37)
MCV RBC AUTO: 92.8 FL (ref 81–99)
MONOCYTES # BLD: 0.7 K/UL (ref 0–0.9)
MONOCYTES NFR BLD: 12.4 % (ref 0–10)
NEUTROPHILS # BLD: 2.7 K/UL (ref 1.5–7.5)
NEUTS SEG NFR BLD: 50.1 % (ref 50–65)
NITRITE, POC: NORMAL
PH, POC: 6
PLATELET # BLD AUTO: 330 K/UL (ref 130–400)
PMV BLD AUTO: 10.1 FL (ref 9.4–12.3)
POTASSIUM SERPL-SCNC: 4.3 MMOL/L (ref 3.5–5)
PROT SERPL-MCNC: 8.1 G/DL (ref 6.4–8.3)
PROTEIN, POC: 30 MG/DL
RBC # BLD AUTO: 4.73 M/UL (ref 4.2–5.4)
SODIUM SERPL-SCNC: 142 MMOL/L (ref 136–145)
SPECIFIC GRAVITY, POC: 1.02
TSH SERPL DL<=0.005 MIU/L-ACNC: 1.85 UIU/ML (ref 0.27–4.2)
UROBILINOGEN, POC: 0.2 MG/DL
WBC # BLD AUTO: 5.4 K/UL (ref 4.8–10.8)

## 2025-01-02 PROCEDURE — 81002 URINALYSIS NONAUTO W/O SCOPE: CPT | Performed by: NURSE PRACTITIONER

## 2025-01-02 PROCEDURE — 99214 OFFICE O/P EST MOD 30 MIN: CPT | Performed by: NURSE PRACTITIONER

## 2025-01-02 RX ORDER — VENLAFAXINE HYDROCHLORIDE 37.5 MG/1
37.5 CAPSULE, EXTENDED RELEASE ORAL DAILY
Qty: 30 CAPSULE | Refills: 0 | Status: SHIPPED | OUTPATIENT
Start: 2025-01-02

## 2025-01-02 RX ORDER — NITROFURANTOIN 25; 75 MG/1; MG/1
100 CAPSULE ORAL 2 TIMES DAILY
Qty: 20 CAPSULE | Refills: 0 | Status: SHIPPED | OUTPATIENT
Start: 2025-01-02 | End: 2025-01-12

## 2025-01-02 ASSESSMENT — PATIENT HEALTH QUESTIONNAIRE - PHQ9
SUM OF ALL RESPONSES TO PHQ QUESTIONS 1-9: 13
5. POOR APPETITE OR OVEREATING: MORE THAN HALF THE DAYS
SUM OF ALL RESPONSES TO PHQ9 QUESTIONS 1 & 2: 3
8. MOVING OR SPEAKING SO SLOWLY THAT OTHER PEOPLE COULD HAVE NOTICED. OR THE OPPOSITE, BEING SO FIGETY OR RESTLESS THAT YOU HAVE BEEN MOVING AROUND A LOT MORE THAN USUAL: NOT AT ALL
2. FEELING DOWN, DEPRESSED OR HOPELESS: SEVERAL DAYS
4. FEELING TIRED OR HAVING LITTLE ENERGY: NEARLY EVERY DAY
7. TROUBLE CONCENTRATING ON THINGS, SUCH AS READING THE NEWSPAPER OR WATCHING TELEVISION: NOT AT ALL
SUM OF ALL RESPONSES TO PHQ QUESTIONS 1-9: 12
3. TROUBLE FALLING OR STAYING ASLEEP: NEARLY EVERY DAY
1. LITTLE INTEREST OR PLEASURE IN DOING THINGS: MORE THAN HALF THE DAYS
9. THOUGHTS THAT YOU WOULD BE BETTER OFF DEAD, OR OF HURTING YOURSELF: SEVERAL DAYS
SUM OF ALL RESPONSES TO PHQ QUESTIONS 1-9: 13
6. FEELING BAD ABOUT YOURSELF - OR THAT YOU ARE A FAILURE OR HAVE LET YOURSELF OR YOUR FAMILY DOWN: SEVERAL DAYS
SUM OF ALL RESPONSES TO PHQ QUESTIONS 1-9: 13
10. IF YOU CHECKED OFF ANY PROBLEMS, HOW DIFFICULT HAVE THESE PROBLEMS MADE IT FOR YOU TO DO YOUR WORK, TAKE CARE OF THINGS AT HOME, OR GET ALONG WITH OTHER PEOPLE: NOT DIFFICULT AT ALL

## 2025-01-02 ASSESSMENT — ENCOUNTER SYMPTOMS
RESPIRATORY NEGATIVE: 1
SHORTNESS OF BREATH: 0
GASTROINTESTINAL NEGATIVE: 1
WHEEZING: 0
COUGH: 0
EYES NEGATIVE: 1
ALLERGIC/IMMUNOLOGIC NEGATIVE: 1

## 2025-01-02 ASSESSMENT — COLUMBIA-SUICIDE SEVERITY RATING SCALE - C-SSRS
1. WITHIN THE PAST MONTH, HAVE YOU WISHED YOU WERE DEAD OR WISHED YOU COULD GO TO SLEEP AND NOT WAKE UP?: NO
6. HAVE YOU EVER DONE ANYTHING, STARTED TO DO ANYTHING, OR PREPARED TO DO ANYTHING TO END YOUR LIFE?: NO
2. HAVE YOU ACTUALLY HAD ANY THOUGHTS OF KILLING YOURSELF?: NO

## 2025-01-02 NOTE — PROGRESS NOTES
[facility]  [facility]  [address]  [place] [address]  Dept: [phone]  Dept Fax: [phone]  Loc: [phone]    Priyanka Nuñez is a 49 y.o. female who presents today for her medical conditions/complaints as noted below.  Priyanka Nuñez is c/o of Hematuria (Pt is here for hematuria. Pt states around zehra she started getting uti symptoms. ) and Urinary Frequency (Pt states she is having urinary frequency. And burning. )        HPI:     HPI this 49-year-old female presents today for blood in her urine.  She states around Zehra she was sick and had fever.  States then that got better but then she started noticing she was having urinary tract symptoms such as urinary frequency, burning and then now has noticed some mild blood when she wipes with tissue.  She denies any new fever.  She does report that her urine has been dark and has been malodorous  Chief Complaint   Patient presents with    Hematuria     Pt is here for hematuria. Pt states around zehra she started getting uti symptoms.     Urinary Frequency     Pt states she is having urinary frequency. And burning.      Past Medical History:   Diagnosis Date    Anxiety     C. difficile colitis     Carpal tunnel syndrome on right     Depression     Diverticulitis     Fibromyalgia     Headache(784.0)     History of blood transfusion     Irritable bowel syndrome       Past Surgical History:   Procedure Laterality Date     SECTION      x 2    COLONOSCOPY N/A 2020    Dr HendricksonJdzbxckxktfcp-Ihfrsneckjkxys-RZS, 5 yr recall    COLONOSCOPY N/A 2024    Dr Hendrickson, no clear-cut lesions to explain pt current symptoms were discovered, Mod diverticulosis left colon, int hem Gr 1, 5 year recall    MOUTH SURGERY      TUBAL LIGATION             2025     2:19 PM 2024     1:28 PM 2024     3:00 PM 2024     2:44 PM 2024     1:20 PM 2024     1:00 PM   Vitals   SYSTOLIC [phone]

## 2025-01-02 NOTE — PATIENT INSTRUCTIONS
Magnesium glycinate 200 - 400 mg nightly    Sleep Hygiene      Avoid bright overhead lights in the evenings.  Use lamps and reduced lighting as it gets dark outside.  Keep sleeping area cool and use only for sleep.  Avoid screen time for one to two hours before sleep.  Avoid caffeine four hours before bedtime.  Develop bedtime routine and be consistent with it.  Such as warm shower/bath.   Ice pack to forehead may help calm.  Calming habit such as Reading/meditation/music therapy.  May take Melatonin one hour before bedtime.

## 2025-01-03 DIAGNOSIS — N17.9 ACUTE KIDNEY INJURY (HCC): Primary | ICD-10-CM

## 2025-01-04 LAB — BACTERIA UR CULT: NORMAL

## 2025-01-24 RX ORDER — VENLAFAXINE HYDROCHLORIDE 37.5 MG/1
CAPSULE, EXTENDED RELEASE ORAL DAILY
Qty: 90 CAPSULE | Refills: 1 | Status: SHIPPED | OUTPATIENT
Start: 2025-01-24

## 2025-02-06 ENCOUNTER — OFFICE VISIT (OUTPATIENT)
Dept: PRIMARY CARE CLINIC | Age: 50
End: 2025-02-06
Payer: COMMERCIAL

## 2025-02-06 VITALS
RESPIRATION RATE: 16 BRPM | TEMPERATURE: 97.9 F | HEIGHT: 65 IN | WEIGHT: 208 LBS | OXYGEN SATURATION: 100 % | DIASTOLIC BLOOD PRESSURE: 80 MMHG | BODY MASS INDEX: 34.66 KG/M2 | HEART RATE: 92 BPM | SYSTOLIC BLOOD PRESSURE: 126 MMHG

## 2025-02-06 DIAGNOSIS — F33.1 MODERATE EPISODE OF RECURRENT MAJOR DEPRESSIVE DISORDER (HCC): Primary | ICD-10-CM

## 2025-02-06 DIAGNOSIS — E55.9 VITAMIN D DEFICIENCY: ICD-10-CM

## 2025-02-06 PROCEDURE — 99214 OFFICE O/P EST MOD 30 MIN: CPT | Performed by: NURSE PRACTITIONER

## 2025-02-06 RX ORDER — BUPROPION HYDROCHLORIDE 150 MG/1
150 TABLET ORAL EVERY MORNING
Qty: 30 TABLET | Refills: 3 | Status: SHIPPED | OUTPATIENT
Start: 2025-02-06

## 2025-02-06 RX ORDER — CYCLOBENZAPRINE HCL 10 MG
10 TABLET ORAL 3 TIMES DAILY PRN
COMMUNITY

## 2025-02-06 SDOH — ECONOMIC STABILITY: INCOME INSECURITY: IN THE LAST 12 MONTHS, WAS THERE A TIME WHEN YOU WERE NOT ABLE TO PAY THE MORTGAGE OR RENT ON TIME?: NO

## 2025-02-06 SDOH — ECONOMIC STABILITY: FOOD INSECURITY: WITHIN THE PAST 12 MONTHS, THE FOOD YOU BOUGHT JUST DIDN'T LAST AND YOU DIDN'T HAVE MONEY TO GET MORE.: NEVER TRUE

## 2025-02-06 SDOH — ECONOMIC STABILITY: FOOD INSECURITY: WITHIN THE PAST 12 MONTHS, YOU WORRIED THAT YOUR FOOD WOULD RUN OUT BEFORE YOU GOT MONEY TO BUY MORE.: SOMETIMES TRUE

## 2025-02-06 SDOH — ECONOMIC STABILITY: FOOD INSECURITY: WITHIN THE PAST 12 MONTHS, YOU WORRIED THAT YOUR FOOD WOULD RUN OUT BEFORE YOU GOT MONEY TO BUY MORE.: NEVER TRUE

## 2025-02-06 SDOH — ECONOMIC STABILITY: TRANSPORTATION INSECURITY
IN THE PAST 12 MONTHS, HAS THE LACK OF TRANSPORTATION KEPT YOU FROM MEDICAL APPOINTMENTS OR FROM GETTING MEDICATIONS?: NO

## 2025-02-06 SDOH — ECONOMIC STABILITY: TRANSPORTATION INSECURITY
IN THE PAST 12 MONTHS, HAS LACK OF TRANSPORTATION KEPT YOU FROM MEETINGS, WORK, OR FROM GETTING THINGS NEEDED FOR DAILY LIVING?: NO

## 2025-02-06 ASSESSMENT — PATIENT HEALTH QUESTIONNAIRE - PHQ9
3. TROUBLE FALLING OR STAYING ASLEEP: MORE THAN HALF THE DAYS
10. IF YOU CHECKED OFF ANY PROBLEMS, HOW DIFFICULT HAVE THESE PROBLEMS MADE IT FOR YOU TO DO YOUR WORK, TAKE CARE OF THINGS AT HOME, OR GET ALONG WITH OTHER PEOPLE: VERY DIFFICULT
5. POOR APPETITE OR OVEREATING: NOT AT ALL
1. LITTLE INTEREST OR PLEASURE IN DOING THINGS: MORE THAN HALF THE DAYS
SUM OF ALL RESPONSES TO PHQ QUESTIONS 1-9: 9
SUM OF ALL RESPONSES TO PHQ QUESTIONS 1-9: 9
6. FEELING BAD ABOUT YOURSELF - OR THAT YOU ARE A FAILURE OR HAVE LET YOURSELF OR YOUR FAMILY DOWN: SEVERAL DAYS
7. TROUBLE CONCENTRATING ON THINGS, SUCH AS READING THE NEWSPAPER OR WATCHING TELEVISION: NOT AT ALL
SUM OF ALL RESPONSES TO PHQ QUESTIONS 1-9: 9
4. FEELING TIRED OR HAVING LITTLE ENERGY: NEARLY EVERY DAY
SUM OF ALL RESPONSES TO PHQ QUESTIONS 1-9: 9
SUM OF ALL RESPONSES TO PHQ9 QUESTIONS 1 & 2: 3
8. MOVING OR SPEAKING SO SLOWLY THAT OTHER PEOPLE COULD HAVE NOTICED. OR THE OPPOSITE, BEING SO FIGETY OR RESTLESS THAT YOU HAVE BEEN MOVING AROUND A LOT MORE THAN USUAL: NOT AT ALL
2. FEELING DOWN, DEPRESSED OR HOPELESS: SEVERAL DAYS
9. THOUGHTS THAT YOU WOULD BE BETTER OFF DEAD, OR OF HURTING YOURSELF: NOT AT ALL

## 2025-02-06 NOTE — PROGRESS NOTES
[facility]  [place] [id number] [facility]  [place] [address]  Dept: [phone]  Dept Fax: [phone]  Loc: [phone]    Priyanka Nuñez is a 49 y.o. female who presents today for her medical conditions/complaints as noted below.  Priyanka Nuñez is c/o of Follow-up (1-month-f/u.  Effexor.  Patient states little improvement with medication.  C/o making mistakes at work; like her brain wires are crossed.  ) and Depression (Screening performed with patient.  )        HPI:     HPI 59-year-old female presents today for follow-up on her depression.  She states she does flex she is seeing some improvement on the Effexor.  She would like to stay with this.  She does report that she may have a side effect from it she is not sure if it is the medication or if it something else.  States that she has made a few errors at work.  Symptoms of feels like her brain wires were crossed.  She denies any other side effects.  She denies any SI or HI.  Chief Complaint   Patient presents with    Follow-up     1-month-f/u.  Effexor.  Patient states little improvement with medication.  C/o making mistakes at work; like her brain wires are crossed.      Depression     Screening performed with patient.       Past Medical History:   Diagnosis Date    Anxiety     C. difficile colitis     Carpal tunnel syndrome on right     Depression     Diverticulitis     Fibromyalgia     Headache(784.0)     History of blood transfusion     Irritable bowel syndrome       Past Surgical History:   Procedure Laterality Date     SECTION      x 2    COLONOSCOPY N/A 2020    Dr HendricksonEpwczkajkxlpo-Rtnujjjtgabmml-RKT, 5 yr recall    COLONOSCOPY N/A 2024    Dr Hendrickson, no clear-cut lesions to explain pt current symptoms were discovered, Mod diverticulosis left colon, int hem Gr 1, 5 year recall    MOUTH SURGERY      TUBAL LIGATION             2025     1:29 PM 2025     2:19 PM 2024     1:28 PM

## 2025-02-12 ASSESSMENT — ENCOUNTER SYMPTOMS
EYES NEGATIVE: 1
SHORTNESS OF BREATH: 0
RESPIRATORY NEGATIVE: 1
GASTROINTESTINAL NEGATIVE: 1
ALLERGIC/IMMUNOLOGIC NEGATIVE: 1
COUGH: 0
WHEEZING: 0

## 2025-03-03 RX ORDER — BUPROPION HYDROCHLORIDE 150 MG/1
150 TABLET ORAL EVERY MORNING
Qty: 90 TABLET | Refills: 2 | Status: SHIPPED | OUTPATIENT
Start: 2025-03-03

## 2025-03-06 ENCOUNTER — OFFICE VISIT (OUTPATIENT)
Dept: PRIMARY CARE CLINIC | Age: 50
End: 2025-03-06
Payer: COMMERCIAL

## 2025-03-06 VITALS
HEIGHT: 65 IN | WEIGHT: 210 LBS | SYSTOLIC BLOOD PRESSURE: 130 MMHG | HEART RATE: 78 BPM | OXYGEN SATURATION: 100 % | RESPIRATION RATE: 16 BRPM | DIASTOLIC BLOOD PRESSURE: 80 MMHG | BODY MASS INDEX: 34.99 KG/M2 | TEMPERATURE: 97.4 F

## 2025-03-06 DIAGNOSIS — F51.01 PRIMARY INSOMNIA: ICD-10-CM

## 2025-03-06 DIAGNOSIS — F33.1 MODERATE EPISODE OF RECURRENT MAJOR DEPRESSIVE DISORDER (HCC): Primary | ICD-10-CM

## 2025-03-06 PROCEDURE — 99214 OFFICE O/P EST MOD 30 MIN: CPT | Performed by: NURSE PRACTITIONER

## 2025-03-06 RX ORDER — MAGNESIUM GLUCONATE 27 MG(500)
500 TABLET ORAL 2 TIMES DAILY
COMMUNITY

## 2025-03-06 ASSESSMENT — ENCOUNTER SYMPTOMS
COUGH: 0
GASTROINTESTINAL NEGATIVE: 1
ALLERGIC/IMMUNOLOGIC NEGATIVE: 1
WHEEZING: 0
RESPIRATORY NEGATIVE: 1
ABDOMINAL PAIN: 0
ABDOMINAL DISTENTION: 0
SHORTNESS OF BREATH: 0
EYES NEGATIVE: 1

## 2025-03-06 ASSESSMENT — PATIENT HEALTH QUESTIONNAIRE - PHQ9
4. FEELING TIRED OR HAVING LITTLE ENERGY: NOT AT ALL
2. FEELING DOWN, DEPRESSED OR HOPELESS: NOT AT ALL
1. LITTLE INTEREST OR PLEASURE IN DOING THINGS: SEVERAL DAYS
8. MOVING OR SPEAKING SO SLOWLY THAT OTHER PEOPLE COULD HAVE NOTICED. OR THE OPPOSITE, BEING SO FIGETY OR RESTLESS THAT YOU HAVE BEEN MOVING AROUND A LOT MORE THAN USUAL: NOT AT ALL
SUM OF ALL RESPONSES TO PHQ QUESTIONS 1-9: 1
2. FEELING DOWN, DEPRESSED OR HOPELESS: NOT AT ALL
SUM OF ALL RESPONSES TO PHQ QUESTIONS 1-9: 1
9. THOUGHTS THAT YOU WOULD BE BETTER OFF DEAD, OR OF HURTING YOURSELF: NOT AT ALL
5. POOR APPETITE OR OVEREATING: NOT AT ALL
6. FEELING BAD ABOUT YOURSELF - OR THAT YOU ARE A FAILURE OR HAVE LET YOURSELF OR YOUR FAMILY DOWN: NOT AT ALL
SUM OF ALL RESPONSES TO PHQ QUESTIONS 1-9: 1
3. TROUBLE FALLING OR STAYING ASLEEP: NOT AT ALL
1. LITTLE INTEREST OR PLEASURE IN DOING THINGS: SEVERAL DAYS
10. IF YOU CHECKED OFF ANY PROBLEMS, HOW DIFFICULT HAVE THESE PROBLEMS MADE IT FOR YOU TO DO YOUR WORK, TAKE CARE OF THINGS AT HOME, OR GET ALONG WITH OTHER PEOPLE: NOT DIFFICULT AT ALL
SUM OF ALL RESPONSES TO PHQ QUESTIONS 1-9: 1
SUM OF ALL RESPONSES TO PHQ QUESTIONS 1-9: 1
7. TROUBLE CONCENTRATING ON THINGS, SUCH AS READING THE NEWSPAPER OR WATCHING TELEVISION: NOT AT ALL
SUM OF ALL RESPONSES TO PHQ QUESTIONS 1-9: 1

## 2025-03-06 NOTE — PROGRESS NOTES
[facility]  [place] [id number] [facility]  [place] [address]  Dept: [phone]  Dept Fax: [phone]  Loc: [phone]    Priyanka Nuñez is a 49 y.o. female who presents today for her medical conditions/complaints as noted below.  Priyanka Nuñez is c/o of Follow-up (1-month-f/u.  Depression.  Screening performed with patient.  Doing good on medications. )        HPI:     HPI this 49-year-old female presents today for follow-up on her depression.  She states that she is doing well on her current regimen.  Pt denies any side effects . Depression improved.  She also reports that she is falling asleep faster but still at times will wake up at 1 to 3:00 in the morning and can go back to sleep.  Melatonin 10 mg at 6 then ZZ quil . Normally takes 50 pages to go to sleep but now only 5 pages.  She states she tries to go to bed around 830.    Only takes flexeril at bedtime when arthritis flairs up.   Chief Complaint   Patient presents with    Follow-up     1-month-f/u.  Depression.  Screening performed with patient.  Doing good on medications.      Past Medical History:   Diagnosis Date    Anxiety     C. difficile colitis     Carpal tunnel syndrome on right     Depression     Diverticulitis     Fibromyalgia     Headache(784.0)     History of blood transfusion     Irritable bowel syndrome       Past Surgical History:   Procedure Laterality Date     SECTION      x 2    COLONOSCOPY N/A 2020    Dr HendricksonNqthmbbhhqjak-Soldxtjcynpth[facility], 5 yr recall    COLONOSCOPY N/A 2024    Dr Hendrickson, no clear-cut lesions to explain pt current symptoms were discovered, Mod diverticulosis left colon, int hem Gr 1, 5 year recall    MOUTH SURGERY      TUBAL LIGATION             3/6/2025     1:15 PM 2025     1:29 PM 2025     2:19 PM 2024     1:28 PM 2024     3:00 PM 2024     2:44 PM   Vitals   SYSTOLIC 130 126 134 138 137 115   DIASTOLIC 80 80 88 88 89 68   Site Left

## 2025-03-06 NOTE — PATIENT INSTRUCTIONS
Try slow release melatonin     Eye patch during sleep     Medcline pillows     Sprain/strain   First 24 hours, use ice to injury site for 15 minutes at a time.  After 48 hours, may alternate ice/heat 15 minutes each.  R.I. C.E. therapy = rest, ice, compression and elevation.  May use ace wrap to injured area for compression.  Use Ibuprofen or other antiinflammatory for pain and inflammation.   If no open skin areas, OTC topical lidocaine such as Icy Hot or capsaicin creams may be applied for pain relief.  Slow stretches of area may help reduce spasms and improve range of motion.   Alternate Voltaren cream with Biofreeze gel to site 2 to 3 times a day .

## 2025-04-24 ENCOUNTER — OFFICE VISIT (OUTPATIENT)
Dept: PRIMARY CARE CLINIC | Age: 50
End: 2025-04-24
Payer: COMMERCIAL

## 2025-04-24 VITALS
TEMPERATURE: 97.5 F | OXYGEN SATURATION: 99 % | SYSTOLIC BLOOD PRESSURE: 112 MMHG | BODY MASS INDEX: 34.66 KG/M2 | HEIGHT: 65 IN | RESPIRATION RATE: 16 BRPM | DIASTOLIC BLOOD PRESSURE: 72 MMHG | HEART RATE: 82 BPM | WEIGHT: 208 LBS

## 2025-04-24 DIAGNOSIS — K57.92 DIVERTICULITIS: Primary | ICD-10-CM

## 2025-04-24 PROCEDURE — 99213 OFFICE O/P EST LOW 20 MIN: CPT | Performed by: FAMILY MEDICINE

## 2025-04-24 RX ORDER — METRONIDAZOLE 500 MG/1
500 TABLET ORAL 3 TIMES DAILY
Qty: 30 TABLET | Refills: 0 | Status: SHIPPED | OUTPATIENT
Start: 2025-04-24 | End: 2025-05-04

## 2025-04-24 RX ORDER — CIPROFLOXACIN 500 MG/1
500 TABLET, FILM COATED ORAL 2 TIMES DAILY
Qty: 20 TABLET | Refills: 0 | Status: SHIPPED | OUTPATIENT
Start: 2025-04-24 | End: 2025-05-04

## 2025-04-24 ASSESSMENT — ENCOUNTER SYMPTOMS
DIARRHEA: 1
BACK PAIN: 0
COUGH: 0
EYE DISCHARGE: 0
ABDOMINAL PAIN: 1
NAUSEA: 0
WHEEZING: 0
COLOR CHANGE: 0
VOMITING: 0

## 2025-04-24 NOTE — PROGRESS NOTES
SUBJECTIVE:    Patient ID: Priyanka Nuñez is a 49 y.o. female.    History of Present Illness  The patient presents for evaluation of diarrhea.  She has a history of diverticulitis and feels like this is one of her typical flares.  She wanted to come in to be seen sooner this time that she waited last time because last time it took a while for it to resolve.    The chief complaint is diarrhea, which has been ongoing for approximately one week. This was followed by the onset of lower abdominal pain, which has since subsided. Despite the improvement in pain, diarrhea persists. There is no presence of blood in the stool, but a small amount of mucus is noted. No episodes of vomiting or feelings of nausea are reported.    The last episode of diverticulitis occurred around the same time last year, which was severe and required a recovery period of 4 months. During that episode, there was no C. difficile infection. Initial treatment included vancomycin due to a suspected C. difficile infection, although diarrhea was not present at that time. Subsequent treatments with Cipro and Flagyl were ineffective, leading to the use of Levaquin and Augmentin.    Currently, a liquid diet is being maintained, and solid foods are being reintroduced, starting with crackers and broth, followed by vegetable pureed soup. There is concern about consuming bananas due to a planned trip on 05/10/2025.      Past Medical History:   Diagnosis Date    Anxiety 2015    C. difficile colitis     Carpal tunnel syndrome on right     Depression     Diverticulitis     Fibromyalgia     Headache(784.0)     History of blood transfusion     Irritable bowel syndrome       Current Outpatient Medications on File Prior to Visit   Medication Sig Dispense Refill    magnesium gluconate (MAGONATE) 500 MG tablet Take 1 tablet by mouth 2 times daily      buPROPion (WELLBUTRIN XL) 150 MG extended release tablet TAKE 1 TABLET BY MOUTH EVERY DAY IN THE MORNING 90 tablet 2

## 2025-04-29 ENCOUNTER — PATIENT MESSAGE (OUTPATIENT)
Dept: PRIMARY CARE CLINIC | Age: 50
End: 2025-04-29

## 2025-04-29 RX ORDER — ONDANSETRON 4 MG/1
4 TABLET, ORALLY DISINTEGRATING ORAL 3 TIMES DAILY PRN
Qty: 30 TABLET | Refills: 1 | Status: SHIPPED | OUTPATIENT
Start: 2025-04-29

## 2025-05-20 ENCOUNTER — OFFICE VISIT (OUTPATIENT)
Dept: PRIMARY CARE CLINIC | Age: 50
End: 2025-05-20
Payer: COMMERCIAL

## 2025-05-20 VITALS
DIASTOLIC BLOOD PRESSURE: 82 MMHG | BODY MASS INDEX: 34.49 KG/M2 | RESPIRATION RATE: 16 BRPM | HEIGHT: 65 IN | HEART RATE: 91 BPM | SYSTOLIC BLOOD PRESSURE: 128 MMHG | WEIGHT: 207 LBS | TEMPERATURE: 97.1 F | OXYGEN SATURATION: 100 %

## 2025-05-20 DIAGNOSIS — J02.9 SORE THROAT: Primary | ICD-10-CM

## 2025-05-20 DIAGNOSIS — J06.9 URI, ACUTE: ICD-10-CM

## 2025-05-20 LAB — S PYO AG THROAT QL: NORMAL

## 2025-05-20 PROCEDURE — 87880 STREP A ASSAY W/OPTIC: CPT | Performed by: FAMILY MEDICINE

## 2025-05-20 PROCEDURE — 99213 OFFICE O/P EST LOW 20 MIN: CPT | Performed by: FAMILY MEDICINE

## 2025-05-20 ASSESSMENT — ENCOUNTER SYMPTOMS
COLOR CHANGE: 0
ABDOMINAL PAIN: 0
EYE DISCHARGE: 0
NAUSEA: 0
CHEST TIGHTNESS: 0
SINUS PRESSURE: 0
DIARRHEA: 0
EYE REDNESS: 0
RHINORRHEA: 1
WHEEZING: 0
EYE ITCHING: 0
VOMITING: 0
COUGH: 1

## 2025-05-20 NOTE — PROGRESS NOTES
Drug Screen, Multiple, Urine  Resulted: 4/27/2016 (Edited Result - FINAL)              Urine Drug Screen  Resulted: 3/22/2017 (Final result)              Urine Drug Screen  Resulted: 7/27/2016 (Edited Result - FINAL)                  Medication Contract and Consent for Opioid Use Documents Filed       Patient Documents       Type of Document Status Date Received Received By Description    Medication Contract [Status Missing]  DIXIE MARCIAL Gabapentin 400mg, Ultram 50mg    Medication Contract [Status Missing]  BETTYE BIRMINGHAM JASMIN 06/10/2014    Medication Contract Signed 3/20/2018  1:23 PM JACKY RODRIGUEZ/transcription disclaimer:  Much of this encounter note is electronic transcription/translation of spoken language toprinted texts.  The electronic translation of spoken language may be erroneous, or at times, nonsensical words or phrases may be inadvertently transcribed.  Although I have reviewed the note for such errors, some may still exist.    The patient (or guardian, if applicable) and other individuals in attendance with the patient were advised that Artificial Intelligence will be utilized during this visit to record, process the conversation to generate a clinical note, and support improvement of the AI technology. The patient (or guardian, if applicable) and other individuals in attendance at the appointment consented to the use of AI, including the recording.

## 2025-06-12 ENCOUNTER — OFFICE VISIT (OUTPATIENT)
Dept: PRIMARY CARE CLINIC | Age: 50
End: 2025-06-12
Payer: COMMERCIAL

## 2025-06-12 VITALS
TEMPERATURE: 97.5 F | HEART RATE: 102 BPM | RESPIRATION RATE: 18 BRPM | BODY MASS INDEX: 36.57 KG/M2 | HEIGHT: 64 IN | WEIGHT: 214.2 LBS | OXYGEN SATURATION: 99 % | SYSTOLIC BLOOD PRESSURE: 128 MMHG | DIASTOLIC BLOOD PRESSURE: 90 MMHG

## 2025-06-12 DIAGNOSIS — M19.90 ARTHRITIS: ICD-10-CM

## 2025-06-12 DIAGNOSIS — F33.1 MODERATE EPISODE OF RECURRENT MAJOR DEPRESSIVE DISORDER (HCC): Primary | ICD-10-CM

## 2025-06-12 DIAGNOSIS — Z87.19 H/O DIVERTICULITIS OF COLON: ICD-10-CM

## 2025-06-12 PROCEDURE — 99212 OFFICE O/P EST SF 10 MIN: CPT | Performed by: FAMILY MEDICINE

## 2025-06-12 ASSESSMENT — ENCOUNTER SYMPTOMS
COLOR CHANGE: 0
EYE DISCHARGE: 0
WHEEZING: 0
ABDOMINAL PAIN: 0
BACK PAIN: 0
DIARRHEA: 0
VOMITING: 0
COUGH: 0
NAUSEA: 0

## 2025-06-12 NOTE — PROGRESS NOTES
SUBJECTIVE:    Patient ID: Priyanka Nuñez is a 49 y.o. female.    History of Present Illness  The patient presents for a follow-up visit.    She reports persistent loose stools, which have not yet returned to their normal consistency. However, she is not experiencing any associated pain. No dark or tarry stools have been observed. Her appetite remains robust, and she has not experienced any fevers. She underwent a colonoscopy in 2024, following a severe flare-up, which yielded normal results. The plan was to repeat the procedure in 5 years. NSAIDs have been identified as a potential trigger for her symptoms. She is currently on a fiber supplement regimen.    She is currently taking NSAIDs for headaches and arthritis. She is seeking alternative treatment options for her arthritis to avoid continuous NSAID use. She suspects that her second flare-up was triggered by the use of meloxicam.          Past Medical History:   Diagnosis Date    Anxiety 2015    C. difficile colitis     Carpal tunnel syndrome on right     Depression     Diverticulitis     Fibromyalgia     Headache(784.0)     History of blood transfusion     Irritable bowel syndrome       Current Outpatient Medications on File Prior to Visit   Medication Sig Dispense Refill    ondansetron (ZOFRAN-ODT) 4 MG disintegrating tablet Take 1 tablet by mouth 3 times daily as needed for Nausea or Vomiting 30 tablet 1    magnesium gluconate (MAGONATE) 500 MG tablet Take 1 tablet by mouth 2 times daily      buPROPion (WELLBUTRIN XL) 150 MG extended release tablet TAKE 1 TABLET BY MOUTH EVERY DAY IN THE MORNING 90 tablet 2    cyclobenzaprine (FLEXERIL) 10 MG tablet Take 1 tablet by mouth 3 times daily as needed for Muscle spasms      venlafaxine (EFFEXOR XR) 37.5 MG extended release capsule TAKE 1 CAPSULE BY MOUTH EVERY DAY 90 capsule 1    dicyclomine (BENTYL) 20 MG tablet Take 1 tablet by mouth 4 times daily 360 tablet 3    Multiple Vitamins-Minerals (ONE-A-DAY WOMENS) TABS

## 2025-08-04 RX ORDER — VENLAFAXINE HYDROCHLORIDE 37.5 MG/1
CAPSULE, EXTENDED RELEASE ORAL DAILY
Qty: 90 CAPSULE | Refills: 1 | Status: SHIPPED | OUTPATIENT
Start: 2025-08-04

## (undated) DEVICE — BRUSH ENDOSCP 2 END CHN HEDGEHOG

## (undated) DEVICE — ADAPTER CLEANING PORPOISE CLEANING

## (undated) DEVICE — SUPPLEMENT DIGESTIVE H2O SOL GI-EASE

## (undated) DEVICE — CANNULA NSL AD L7FT DIV O2 CO2 W/ M LUERLOCK TRMPT CONN

## (undated) DEVICE — SINGLE PORT MANIFOLD: Brand: NEPTUNE 2

## (undated) DEVICE — ENDO KIT,LOURDES HOSPITAL: Brand: MEDLINE INDUSTRIES, INC.

## (undated) DEVICE — CLEANING SPONGE: Brand: KOALA™